# Patient Record
Sex: FEMALE | Race: WHITE | NOT HISPANIC OR LATINO | Employment: OTHER | URBAN - METROPOLITAN AREA
[De-identification: names, ages, dates, MRNs, and addresses within clinical notes are randomized per-mention and may not be internally consistent; named-entity substitution may affect disease eponyms.]

---

## 2016-06-23 LAB
HUMAN PAPILLOMAVIRUS (HPV): NORMAL
PAP SMEAR: NORMAL

## 2017-01-30 RX ORDER — FLUTICASONE PROPIONATE AND SALMETEROL 50; 250 UG/1; UG/1
POWDER RESPIRATORY (INHALATION)
Qty: 60 EACH | Refills: 11 | Status: SHIPPED | OUTPATIENT
Start: 2017-01-30 | End: 2018-05-22 | Stop reason: SDUPTHER

## 2017-03-17 RX ORDER — ONDANSETRON HYDROCHLORIDE 8 MG/1
TABLET, FILM COATED ORAL
Qty: 30 TABLET | Refills: 5 | Status: SHIPPED | OUTPATIENT
Start: 2017-03-17 | End: 2017-08-07 | Stop reason: SDUPTHER

## 2017-03-28 DIAGNOSIS — E03.4 HYPOTHYROIDISM DUE TO ACQUIRED ATROPHY OF THYROID: ICD-10-CM

## 2017-03-28 RX ORDER — LEVOTHYROXINE SODIUM 200 UG/1
TABLET ORAL
Qty: 30 TABLET | Refills: 11 | Status: SHIPPED | OUTPATIENT
Start: 2017-03-28 | End: 2017-08-07 | Stop reason: SDUPTHER

## 2017-08-03 ENCOUNTER — LAB VISIT (OUTPATIENT)
Dept: LAB | Facility: HOSPITAL | Age: 47
End: 2017-08-03
Attending: FAMILY MEDICINE
Payer: MEDICARE

## 2017-08-03 DIAGNOSIS — E03.4 HYPOTHYROIDISM DUE TO ACQUIRED ATROPHY OF THYROID: ICD-10-CM

## 2017-08-03 DIAGNOSIS — E78.5 HLD (HYPERLIPIDEMIA): ICD-10-CM

## 2017-08-03 LAB
ALBUMIN SERPL BCP-MCNC: 3.9 G/DL
ALP SERPL-CCNC: 48 U/L
ALT SERPL W/O P-5'-P-CCNC: 11 U/L
ANION GAP SERPL CALC-SCNC: 11 MMOL/L
AST SERPL-CCNC: 15 U/L
BILIRUB SERPL-MCNC: 0.4 MG/DL
BUN SERPL-MCNC: 10 MG/DL
CALCIUM SERPL-MCNC: 9.3 MG/DL
CHLORIDE SERPL-SCNC: 101 MMOL/L
CHOLEST/HDLC SERPL: 4.8 {RATIO}
CO2 SERPL-SCNC: 20 MMOL/L
CREAT SERPL-MCNC: 0.7 MG/DL
EST. GFR  (AFRICAN AMERICAN): >60 ML/MIN/1.73 M^2
EST. GFR  (NON AFRICAN AMERICAN): >60 ML/MIN/1.73 M^2
GLUCOSE SERPL-MCNC: 81 MG/DL
HDL/CHOLESTEROL RATIO: 20.6 %
HDLC SERPL-MCNC: 286 MG/DL
HDLC SERPL-MCNC: 59 MG/DL
LDLC SERPL CALC-MCNC: 178.2 MG/DL
NONHDLC SERPL-MCNC: 227 MG/DL
POTASSIUM SERPL-SCNC: 4 MMOL/L
PROT SERPL-MCNC: 7 G/DL
SODIUM SERPL-SCNC: 132 MMOL/L
TRIGL SERPL-MCNC: 244 MG/DL
TSH SERPL DL<=0.005 MIU/L-ACNC: 2.85 UIU/ML

## 2017-08-03 PROCEDURE — 80053 COMPREHEN METABOLIC PANEL: CPT

## 2017-08-03 PROCEDURE — 84443 ASSAY THYROID STIM HORMONE: CPT

## 2017-08-03 PROCEDURE — 80061 LIPID PANEL: CPT

## 2017-08-03 PROCEDURE — 36415 COLL VENOUS BLD VENIPUNCTURE: CPT | Mod: PO

## 2017-08-07 ENCOUNTER — OFFICE VISIT (OUTPATIENT)
Dept: FAMILY MEDICINE | Facility: CLINIC | Age: 47
End: 2017-08-07
Payer: MEDICARE

## 2017-08-07 ENCOUNTER — LAB VISIT (OUTPATIENT)
Dept: LAB | Facility: HOSPITAL | Age: 47
End: 2017-08-07
Attending: NURSE PRACTITIONER
Payer: MEDICARE

## 2017-08-07 VITALS
HEART RATE: 80 BPM | OXYGEN SATURATION: 97 % | SYSTOLIC BLOOD PRESSURE: 110 MMHG | TEMPERATURE: 98 F | RESPIRATION RATE: 18 BRPM | HEIGHT: 67 IN | WEIGHT: 169.75 LBS | BODY MASS INDEX: 26.64 KG/M2 | DIASTOLIC BLOOD PRESSURE: 72 MMHG

## 2017-08-07 DIAGNOSIS — F32.A DEPRESSION, UNSPECIFIED DEPRESSION TYPE: Primary | ICD-10-CM

## 2017-08-07 DIAGNOSIS — E03.4 HYPOTHYROIDISM DUE TO ACQUIRED ATROPHY OF THYROID: ICD-10-CM

## 2017-08-07 DIAGNOSIS — F31.9 BIPOLAR 1 DISORDER: ICD-10-CM

## 2017-08-07 DIAGNOSIS — H81.03 MENIERE'S DISEASE OF BOTH EARS: ICD-10-CM

## 2017-08-07 DIAGNOSIS — F17.200 SMOKER: ICD-10-CM

## 2017-08-07 DIAGNOSIS — E87.1 HYPONATREMIA: ICD-10-CM

## 2017-08-07 DIAGNOSIS — E78.5 HYPERLIPIDEMIA, UNSPECIFIED HYPERLIPIDEMIA TYPE: ICD-10-CM

## 2017-08-07 DIAGNOSIS — G25.81 RESTLESS LEG SYNDROME: ICD-10-CM

## 2017-08-07 DIAGNOSIS — R25.2 LEG CRAMPS: ICD-10-CM

## 2017-08-07 LAB
ANION GAP SERPL CALC-SCNC: 13 MMOL/L
BUN SERPL-MCNC: 11 MG/DL
CALCIUM SERPL-MCNC: 9.2 MG/DL
CHLORIDE SERPL-SCNC: 105 MMOL/L
CK SERPL-CCNC: 79 U/L
CO2 SERPL-SCNC: 20 MMOL/L
CREAT SERPL-MCNC: 0.8 MG/DL
EST. GFR  (AFRICAN AMERICAN): >60 ML/MIN/1.73 M^2
EST. GFR  (NON AFRICAN AMERICAN): >60 ML/MIN/1.73 M^2
GLUCOSE SERPL-MCNC: 114 MG/DL
POTASSIUM SERPL-SCNC: 3.9 MMOL/L
SODIUM SERPL-SCNC: 138 MMOL/L

## 2017-08-07 PROCEDURE — 99999 PR PBB SHADOW E&M-EST. PATIENT-LVL V: CPT | Mod: PBBFAC,,, | Performed by: NURSE PRACTITIONER

## 2017-08-07 PROCEDURE — 99214 OFFICE O/P EST MOD 30 MIN: CPT | Mod: S$GLB,,, | Performed by: NURSE PRACTITIONER

## 2017-08-07 PROCEDURE — 80048 BASIC METABOLIC PNL TOTAL CA: CPT

## 2017-08-07 PROCEDURE — 82550 ASSAY OF CK (CPK): CPT

## 2017-08-07 PROCEDURE — 3008F BODY MASS INDEX DOCD: CPT | Mod: S$GLB,,, | Performed by: NURSE PRACTITIONER

## 2017-08-07 PROCEDURE — 99499 UNLISTED E&M SERVICE: CPT | Mod: S$GLB,,, | Performed by: NURSE PRACTITIONER

## 2017-08-07 PROCEDURE — 36415 COLL VENOUS BLD VENIPUNCTURE: CPT | Mod: PO

## 2017-08-07 RX ORDER — MECLIZINE HYDROCHLORIDE 25 MG/1
25 TABLET ORAL 3 TIMES DAILY PRN
Qty: 90 TABLET | Refills: 5 | Status: SHIPPED | OUTPATIENT
Start: 2017-08-07 | End: 2018-01-04 | Stop reason: SDUPTHER

## 2017-08-07 RX ORDER — BUSPIRONE HYDROCHLORIDE 15 MG/1
TABLET ORAL ONCE
COMMUNITY
Start: 2017-05-16 | End: 2017-08-07 | Stop reason: SDUPTHER

## 2017-08-07 RX ORDER — VARENICLINE TARTRATE 0.5 (11)-1
KIT ORAL
Qty: 1 PACKAGE | Refills: 0 | Status: SHIPPED | OUTPATIENT
Start: 2017-08-07 | End: 2017-09-11 | Stop reason: SDUPTHER

## 2017-08-07 RX ORDER — LEVOTHYROXINE SODIUM 200 UG/1
200 TABLET ORAL DAILY
Qty: 90 TABLET | Refills: 1 | Status: SHIPPED | OUTPATIENT
Start: 2017-08-07 | End: 2018-05-16 | Stop reason: SDUPTHER

## 2017-08-07 RX ORDER — SILVER SULFADIAZINE 10 G/1000G
CREAM TOPICAL
COMMUNITY
Start: 2017-05-02 | End: 2017-08-07

## 2017-08-07 RX ORDER — BUSPIRONE HYDROCHLORIDE 7.5 MG/1
7.5 TABLET ORAL 3 TIMES DAILY
Qty: 270 TABLET | Refills: 0 | Status: SHIPPED | OUTPATIENT
Start: 2017-08-07 | End: 2018-02-26

## 2017-08-07 RX ORDER — ROPINIROLE 0.5 MG/1
TABLET, FILM COATED ORAL
Qty: 60 TABLET | Refills: 1 | Status: SHIPPED | OUTPATIENT
Start: 2017-08-07 | End: 2017-09-04 | Stop reason: SDUPTHER

## 2017-08-07 RX ORDER — PRAVASTATIN SODIUM 40 MG/1
TABLET ORAL
Qty: 90 TABLET | Refills: 3 | Status: SHIPPED | OUTPATIENT
Start: 2017-08-07 | End: 2018-05-21 | Stop reason: ALTCHOICE

## 2017-08-07 RX ORDER — PROMETHAZINE HYDROCHLORIDE 25 MG/1
TABLET ORAL
Refills: 0 | COMMUNITY
Start: 2017-05-30 | End: 2017-08-07

## 2017-08-07 RX ORDER — SERTRALINE HYDROCHLORIDE 100 MG/1
TABLET, FILM COATED ORAL DAILY
COMMUNITY
Start: 2017-06-29 | End: 2017-09-25 | Stop reason: SDUPTHER

## 2017-08-07 RX ORDER — CLONIDINE HYDROCHLORIDE 0.1 MG/1
TABLET ORAL ONCE
COMMUNITY
Start: 2017-07-14 | End: 2017-08-07 | Stop reason: ALTCHOICE

## 2017-08-07 NOTE — PROGRESS NOTES
Subjective:       Patient ID: Noreen Wheatley is a 46 y.o. female.    Chief Complaint: Leg Pain (both leg pain only at night. Pain fo a month. Legs jumped and hurt and can't sleep. Needs med refills Synthroid,cholesterol med,nicotine and antivert)    Last seen 8-2016 with Dr Haile   Off subozone 2 months and klonopin 7 months   Feeling better - since off     Eating worse  HLD- pravastatin 40   Back pain - off all meds   COPD - smoker - advair   Hypothyroid- stable on meds   menieres disease - stable on PRN antivert  Bipolar - still on risperdal, zoloft, trazodone through psych   Taking 15 mg buspar but just  once and day and still with anxiety      Leg Pain    The incident occurred more than 1 week ago (1 mo). The incident occurred at home. There was no injury mechanism. The pain is present in the left leg and right leg. The pain is at a severity of 5/10. The pain is moderate. The pain has been fluctuating since onset. Pertinent negatives include no inability to bear weight, loss of motion, loss of sensation, muscle weakness, numbness or tingling. Associated symptoms comments: Muscle pain . Nothing aggravates the symptoms. She has tried rest for the symptoms. The treatment provided no relief.     Vitals:    08/07/17 1254   BP: 110/72   Pulse: 80   Resp: 18   Temp: 98.3 °F (36.8 °C)     Review of Systems   Constitutional: Negative.  Negative for diaphoresis, fatigue and fever.   HENT: Negative.    Eyes: Negative.    Respiratory: Negative.  Negative for cough, shortness of breath and wheezing.    Cardiovascular: Negative.  Negative for chest pain and leg swelling.   Gastrointestinal: Negative.  Negative for abdominal pain, diarrhea and nausea.   Endocrine: Negative.    Genitourinary: Negative.  Negative for dysuria and hematuria.   Musculoskeletal: Positive for arthralgias and myalgias.        Leg cramping and involuntary movements    Skin: Negative.  Negative for color change and rash.   Allergic/Immunologic:  Negative.    Neurological: Negative.  Negative for tingling, speech difficulty and numbness.   Hematological: Negative.    Psychiatric/Behavioral: Negative.        Past Medical History:   Diagnosis Date    Abnormal Pap smear     19 years old//per patient normal since    Anemia     Anxiety     Asthma, persistent controlled     Bipolar 1 disorder     Cervical dysplasia     Chronic constipation     COPD (chronic obstructive pulmonary disease)     Depression     GERD (gastroesophageal reflux disease)     HLD (hyperlipidemia)     Hypothyroidism     Panic attack      Objective:      Physical Exam   Constitutional: She is oriented to person, place, and time. She appears well-developed and well-nourished.   HENT:   Head: Normocephalic and atraumatic.   Mouth/Throat: Oropharynx is clear and moist.   Eyes: Conjunctivae and EOM are normal. Pupils are equal, round, and reactive to light.   Neck: Neck supple.   Cardiovascular: Normal rate, regular rhythm, normal heart sounds and intact distal pulses.  Exam reveals no friction rub.    No murmur heard.  Pulmonary/Chest: Effort normal and breath sounds normal.   Abdominal: Soft. Bowel sounds are normal.   Musculoskeletal:        Right lower leg: She exhibits no tenderness.        Left lower leg: She exhibits no tenderness.        Legs:  Neurological: She is alert and oriented to person, place, and time.   Skin: Skin is warm and dry.   Psychiatric: She has a normal mood and affect. Her behavior is normal.   Nursing note and vitals reviewed.      Assessment:       1. Depression, unspecified depression type    2. Meniere's disease of both ears    3. Hypothyroidism due to acquired atrophy of thyroid    4. Hyperlipidemia, unspecified hyperlipidemia type    5. Smoker    6. Restless leg syndrome    7. Hyponatremia    8. Leg cramps    9. Bipolar 1 disorder        Plan:       Depression, unspecified depression type  -     busPIRone (BUSPAR) 7.5 MG tablet; Take 1 tablet (7.5 mg  total) by mouth 3 (three) times daily.  Dispense: 270 tablet; Refill: 0    Meniere's disease of both ears  -     meclizine (ANTIVERT) 25 mg tablet; Take 1 tablet (25 mg total) by mouth 3 (three) times daily as needed.  Dispense: 90 tablet; Refill: 5    Hypothyroidism due to acquired atrophy of thyroid  -     levothyroxine (SYNTHROID) 200 MCG tablet; Take 1 tablet (200 mcg total) by mouth once daily.  Dispense: 90 tablet; Refill: 1    Hyperlipidemia, unspecified hyperlipidemia type  -     pravastatin (PRAVACHOL) 40 MG tablet; Take 1 tablet (40 mg total) by mouth once daily.  Dispense: 90 tablet; Refill: 3    Smoker  -     varenicline (CHANTIX JOHN) 0.5 mg (11)- 1 mg (42) tablet; Take as directed on pack  Dispense: 1 Package; Refill: 0  -     Ambulatory referral to Smoking Cessation Program    Restless leg syndrome  -     ropinirole (REQUIP) 0.5 MG tablet; Take 1 tablet at night for 2 night then can increase 2 tablets QHS  Dispense: 60 tablet; Refill: 1    Hyponatremia  -     Basic metabolic panel; Future; Expected date: 08/07/2017    Leg cramps  -     CK; Future; Expected date: 08/07/2017    Bipolar 1 disorder  Stable on meds from her psych       FU in 2-3 months

## 2017-08-11 ENCOUNTER — TELEPHONE (OUTPATIENT)
Dept: SMOKING CESSATION | Facility: CLINIC | Age: 47
End: 2017-08-11

## 2017-08-25 ENCOUNTER — TELEPHONE (OUTPATIENT)
Dept: FAMILY MEDICINE | Facility: CLINIC | Age: 47
End: 2017-08-25

## 2017-08-25 NOTE — TELEPHONE ENCOUNTER
Notified patient that prescriptions were sent on 8/7/17. Advised to call pharmacy and make sure they had received them and let us know if they had not and there was an error.

## 2017-08-25 NOTE — TELEPHONE ENCOUNTER
----- Message from Maria R Diamond sent at 8/24/2017  3:05 PM CDT -----  Contact: Fanny young/ [employer]  Fanny young/ [employer] calling in regards to the patient requesting a refill for Levothyroxine 200 mg and Pravastatin 40 mg, 90 day supply for both. Please advise.  Call back   Thanks!  [facility] [place] - [address] [address]  [address] [address]  [facility] [address]  Phone: [phone] Fax: [phone]

## 2017-08-25 NOTE — TELEPHONE ENCOUNTER
----- Message from Ami Mandujano sent at 8/24/2017  1:24 PM CDT -----  Contact: Noreen Wheatley  Good afternoon,    Ms. Wheatley came in and is requesting a refill on her Thyroid and cholesterol medication. May you please contact  [facility] when you get the moment.

## 2017-09-04 DIAGNOSIS — G25.81 RESTLESS LEG SYNDROME: ICD-10-CM

## 2017-09-05 RX ORDER — ROPINIROLE 0.5 MG/1
1 TABLET, FILM COATED ORAL NIGHTLY
Qty: 60 TABLET | Refills: 1 | Status: SHIPPED | OUTPATIENT
Start: 2017-09-05 | End: 2017-10-06 | Stop reason: SDUPTHER

## 2017-09-11 DIAGNOSIS — F17.200 SMOKER: ICD-10-CM

## 2017-09-11 RX ORDER — VARENICLINE TARTRATE 0.5 (11)-1
KIT ORAL
Qty: 1 PACKAGE | Refills: 0 | Status: SHIPPED | OUTPATIENT
Start: 2017-09-11 | End: 2017-09-19 | Stop reason: DRUGHIGH

## 2017-09-18 DIAGNOSIS — F17.200 SMOKER: ICD-10-CM

## 2017-09-19 RX ORDER — VARENICLINE TARTRATE 0.5 (11)-1
KIT ORAL
Qty: 53 TABLET | OUTPATIENT
Start: 2017-09-19

## 2017-09-19 RX ORDER — VARENICLINE TARTRATE 1 MG/1
1 TABLET, FILM COATED ORAL 2 TIMES DAILY
Qty: 60 TABLET | Refills: 5 | Status: SHIPPED | OUTPATIENT
Start: 2017-09-19 | End: 2017-10-19

## 2017-09-25 RX ORDER — SERTRALINE HYDROCHLORIDE 100 MG/1
100 TABLET, FILM COATED ORAL DAILY
Qty: 30 TABLET | Refills: 5 | Status: SHIPPED | OUTPATIENT
Start: 2017-09-25 | End: 2018-05-21 | Stop reason: ALTCHOICE

## 2017-10-06 DIAGNOSIS — G25.81 RESTLESS LEG SYNDROME: ICD-10-CM

## 2017-10-06 RX ORDER — ROPINIROLE 0.5 MG/1
1 TABLET, FILM COATED ORAL NIGHTLY
Qty: 60 TABLET | Refills: 1 | Status: SHIPPED | OUTPATIENT
Start: 2017-10-06 | End: 2018-01-03 | Stop reason: SDUPTHER

## 2017-10-30 RX ORDER — ONDANSETRON HYDROCHLORIDE 8 MG/1
TABLET, FILM COATED ORAL
Qty: 30 TABLET | Refills: 2 | Status: SHIPPED | OUTPATIENT
Start: 2017-10-30 | End: 2018-01-03 | Stop reason: SDUPTHER

## 2018-01-03 ENCOUNTER — TELEPHONE (OUTPATIENT)
Dept: FAMILY MEDICINE | Facility: CLINIC | Age: 48
End: 2018-01-03

## 2018-01-03 DIAGNOSIS — G25.81 RESTLESS LEG SYNDROME: ICD-10-CM

## 2018-01-03 RX ORDER — ONDANSETRON HYDROCHLORIDE 8 MG/1
TABLET, FILM COATED ORAL
Qty: 30 TABLET | Refills: 5 | Status: SHIPPED | OUTPATIENT
Start: 2018-01-03 | End: 2018-03-05 | Stop reason: SDUPTHER

## 2018-01-03 RX ORDER — ROPINIROLE 0.5 MG/1
1 TABLET, FILM COATED ORAL NIGHTLY
Qty: 60 TABLET | Refills: 0 | Status: SHIPPED | OUTPATIENT
Start: 2018-01-03 | End: 2018-04-30 | Stop reason: SDUPTHER

## 2018-01-03 NOTE — TELEPHONE ENCOUNTER
Notified patient that meds were filled and scheduled her to see Dr Haile for follow up. Patient verbalized understanding.

## 2018-01-04 DIAGNOSIS — H81.03 MENIERE'S DISEASE OF BOTH EARS: ICD-10-CM

## 2018-01-05 RX ORDER — MECLIZINE HYDROCHLORIDE 25 MG/1
25 TABLET ORAL 3 TIMES DAILY PRN
Qty: 90 TABLET | Refills: 0 | Status: ON HOLD | OUTPATIENT
Start: 2018-01-05 | End: 2020-07-06 | Stop reason: HOSPADM

## 2018-02-26 ENCOUNTER — OFFICE VISIT (OUTPATIENT)
Dept: FAMILY MEDICINE | Facility: CLINIC | Age: 48
End: 2018-02-26
Payer: MEDICARE

## 2018-02-26 ENCOUNTER — HOSPITAL ENCOUNTER (OUTPATIENT)
Dept: RADIOLOGY | Facility: HOSPITAL | Age: 48
Discharge: HOME | End: 2018-02-26
Attending: NURSE PRACTITIONER
Payer: MEDICARE

## 2018-02-26 VITALS
HEIGHT: 67 IN | WEIGHT: 170.63 LBS | SYSTOLIC BLOOD PRESSURE: 120 MMHG | HEART RATE: 101 BPM | DIASTOLIC BLOOD PRESSURE: 89 MMHG | RESPIRATION RATE: 16 BRPM | BODY MASS INDEX: 26.78 KG/M2 | OXYGEN SATURATION: 97 %

## 2018-02-26 DIAGNOSIS — J44.9 CHRONIC OBSTRUCTIVE PULMONARY DISEASE, UNSPECIFIED COPD TYPE: ICD-10-CM

## 2018-02-26 DIAGNOSIS — Z20.2 EXPOSURE TO STD: Primary | ICD-10-CM

## 2018-02-26 DIAGNOSIS — G25.81 RESTLESS LEG SYNDROME: ICD-10-CM

## 2018-02-26 DIAGNOSIS — G89.29 CHRONIC BILATERAL LOW BACK PAIN WITHOUT SCIATICA: ICD-10-CM

## 2018-02-26 DIAGNOSIS — M54.50 CHRONIC BILATERAL LOW BACK PAIN WITHOUT SCIATICA: ICD-10-CM

## 2018-02-26 DIAGNOSIS — E03.4 HYPOTHYROIDISM DUE TO ACQUIRED ATROPHY OF THYROID: ICD-10-CM

## 2018-02-26 DIAGNOSIS — R05.9 COUGH: ICD-10-CM

## 2018-02-26 DIAGNOSIS — F17.200 SMOKING: ICD-10-CM

## 2018-02-26 DIAGNOSIS — R06.2 WHEEZING: ICD-10-CM

## 2018-02-26 DIAGNOSIS — E78.5 HYPERLIPIDEMIA, UNSPECIFIED HYPERLIPIDEMIA TYPE: ICD-10-CM

## 2018-02-26 PROCEDURE — 72110 X-RAY EXAM L-2 SPINE 4/>VWS: CPT | Mod: TC,FY,PO

## 2018-02-26 PROCEDURE — 99999 PR PBB SHADOW E&M-EST. PATIENT-LVL V: CPT | Mod: PBBFAC,,, | Performed by: NURSE PRACTITIONER

## 2018-02-26 PROCEDURE — 99214 OFFICE O/P EST MOD 30 MIN: CPT | Mod: S$GLB,,, | Performed by: NURSE PRACTITIONER

## 2018-02-26 PROCEDURE — 3008F BODY MASS INDEX DOCD: CPT | Mod: S$GLB,,, | Performed by: NURSE PRACTITIONER

## 2018-02-26 PROCEDURE — 99499 UNLISTED E&M SERVICE: CPT | Mod: S$GLB,,, | Performed by: NURSE PRACTITIONER

## 2018-02-26 PROCEDURE — 94640 AIRWAY INHALATION TREATMENT: CPT | Mod: S$GLB,,, | Performed by: FAMILY MEDICINE

## 2018-02-26 PROCEDURE — 72110 X-RAY EXAM L-2 SPINE 4/>VWS: CPT | Mod: 26,,, | Performed by: RADIOLOGY

## 2018-02-26 RX ORDER — ALBUTEROL SULFATE 90 UG/1
AEROSOL, METERED RESPIRATORY (INHALATION)
Qty: 18 G | Refills: 11 | Status: SHIPPED | OUTPATIENT
Start: 2018-02-26 | End: 2018-05-21

## 2018-02-26 RX ORDER — ETODOLAC 500 MG/1
500 TABLET, FILM COATED ORAL 2 TIMES DAILY
Qty: 40 TABLET | Refills: 0 | Status: SHIPPED | OUTPATIENT
Start: 2018-02-26 | End: 2018-10-10

## 2018-02-26 RX ORDER — ALBUTEROL SULFATE 0.83 MG/ML
2.5 SOLUTION RESPIRATORY (INHALATION)
Status: COMPLETED | OUTPATIENT
Start: 2018-02-26 | End: 2018-02-26

## 2018-02-26 RX ORDER — ALBUTEROL SULFATE 0.83 MG/ML
2.5 SOLUTION RESPIRATORY (INHALATION) EVERY 6 HOURS PRN
Qty: 75 ML | Refills: 0 | Status: SHIPPED | OUTPATIENT
Start: 2018-02-26 | End: 2018-05-21

## 2018-02-26 RX ADMIN — ALBUTEROL SULFATE 2.5 MG: 0.83 SOLUTION RESPIRATORY (INHALATION) at 12:02

## 2018-02-26 NOTE — PROGRESS NOTES
Subjective:       Patient ID: Noreen Wheatley is a 47 y.o. female.    Chief Complaint: Leg Pain (severe leg pain in both legs; started a year ago); Back Pain (low back pain ); and Exposure to STD (requesting STD )    Here today with back pain- continual  - takes her a minute to get up and walk when she stands - both legs bilaterally- they are cramping and spasms   She took neurontin 600 TID from a friend and elavil 100 from friend     HLD- pravastatin 40   COPD - smoker - advair - wheezing more - smoking more-= needs to quit and needs neb   Hypothyroid- stable on meds   menieres disease - stable on PRN antivert  Bipolar - still on zoloft and buspar - but still with anxiety     Taking 15 mg buspar but just  once and day and still with anxiety  She also states that her ex fiance was cheating on her and she want to be tested for any STD       Leg Pain    The incident occurred more than 1 week ago. The incident occurred at home. There was no injury mechanism. The pain is present in the right thigh and left thigh. The quality of the pain is described as aching. The pain is at a severity of 5/10. The pain is moderate. The pain has been improving since onset. Associated symptoms include muscle weakness. Pertinent negatives include no inability to bear weight, loss of motion, loss of sensation, numbness or tingling. Associated symptoms comments: Burning   Bilateral weakness . She has tried rest (neurontin) for the symptoms.   Back Pain   This is a chronic problem. The current episode started more than 1 year ago. The pain is present in the lumbar spine. The quality of the pain is described as aching. The pain is at a severity of 8/10. The patient is experiencing no pain. The symptoms are aggravated by standing. Stiffness is present all day. Associated symptoms include leg pain and weakness. Pertinent negatives include no abdominal pain, bladder incontinence, bowel incontinence, chest pain, dysuria, fever, headaches, numbness,  paresis, paresthesias, tingling or weight loss.   Cough   This is a recurrent problem. The current episode started 1 to 4 weeks ago. The problem has been unchanged. The problem occurs every few hours. The cough is non-productive. Associated symptoms include myalgias, shortness of breath and wheezing. Pertinent negatives include no chest pain, chills, ear congestion, ear pain, fever, headaches, heartburn, hemoptysis, nasal congestion, postnasal drip, rash, rhinorrhea, sore throat, sweats or weight loss. Risk factors for lung disease include smoking/tobacco exposure. She has tried a beta-agonist inhaler, steroid inhaler and rest for the symptoms. The treatment provided mild relief. Her past medical history is significant for COPD.     Vitals:    02/26/18 1225   BP: 120/89   Pulse: 101   Resp: 16     Review of Systems   Constitutional: Positive for activity change. Negative for appetite change, chills, fever and weight loss.   HENT: Negative.  Negative for ear pain, postnasal drip, rhinorrhea and sore throat.    Respiratory: Positive for cough, shortness of breath and wheezing. Negative for hemoptysis.    Cardiovascular: Negative.  Negative for chest pain.   Gastrointestinal: Negative.  Negative for abdominal pain, bowel incontinence and heartburn.   Genitourinary: Negative.  Negative for bladder incontinence and dysuria.   Musculoskeletal: Positive for arthralgias, back pain and myalgias.   Skin: Negative.  Negative for rash.   Neurological: Positive for weakness. Negative for tingling, numbness, headaches and paresthesias.   Hematological: Negative.    Psychiatric/Behavioral: Negative.        Past Medical History:   Diagnosis Date    Abnormal Pap smear     19 years old//per patient normal since    Anemia     Anxiety     Asthma, persistent controlled     Bipolar 1 disorder     Cervical dysplasia     Chronic constipation     COPD (chronic obstructive pulmonary disease)     Depression     GERD  (gastroesophageal reflux disease)     HLD (hyperlipidemia)     Hypothyroidism     Panic attack      Objective:      Physical Exam   Constitutional: She is oriented to person, place, and time. She appears well-developed and well-nourished.   HENT:   Head: Normocephalic and atraumatic.   Mouth/Throat: Oropharynx is clear and moist.   Eyes: Conjunctivae and EOM are normal. Pupils are equal, round, and reactive to light.   Neck: Neck supple.   Cardiovascular: Normal rate, regular rhythm, normal heart sounds and intact distal pulses.  Exam reveals no friction rub.    No murmur heard.  Pulmonary/Chest: Effort normal. Tachypnea noted. No respiratory distress. She has wheezes. She has rhonchi.   Abdominal: Soft. Bowel sounds are normal.   Musculoskeletal:        Lumbar back: She exhibits decreased range of motion, tenderness, pain and spasm.   Neurological: She is alert and oriented to person, place, and time.   Skin: Skin is warm and dry.   Psychiatric: She has a normal mood and affect. Her speech is normal and behavior is normal. Judgment normal. Cognition and memory are normal.   Nursing note and vitals reviewed.      Assessment:       1. Exposure to STD    2. Chronic bilateral low back pain without sciatica    3. Restless leg syndrome    4. Hypothyroidism due to acquired atrophy of thyroid    5. Hyperlipidemia, unspecified hyperlipidemia type    6. Wheezing    7. Cough    8. Chronic obstructive pulmonary disease, unspecified COPD type    9. Smoking        Plan:       Exposure to STD  -     RPR; Future; Expected date: 02/26/2018  -     HIV-1 and HIV-2 antibodies; Future; Expected date: 02/26/2018  -     Hepatitis panel, acute; Future; Expected date: 02/26/2018  -     C. trachomatis/N. gonorrhoeae by AMP DNA Urine  -     HERPES SIMPLEX VIRUS (HSV) TYPE 1 & 2 DNA BY PCR; Future; Expected date: 02/26/2018    Chronic bilateral low back pain without sciatica  -     X-Ray Lumbar Spine Complete 5 View; Future; Expected date:  02/26/2018  -     etodolac (LODINE) 500 MG tablet; Take 1 tablet (500 mg total) by mouth 2 (two) times daily.  Dispense: 40 tablet; Refill: 0    Restless leg syndrome  -     X-Ray Lumbar Spine Complete 5 View; Future; Expected date: 02/26/2018    Hypothyroidism due to acquired atrophy of thyroid  -     TSH; Future; Expected date: 02/26/2018  -     T4, FREE; Future; Expected date: 02/26/2018    Hyperlipidemia, unspecified hyperlipidemia type  On meds     Wheezing  -     albuterol nebulizer solution 2.5 mg; Take 3 mLs (2.5 mg total) by nebulization one time.  -     NEBULIZER FOR HOME USE  -     albuterol (VENTOLIN HFA) 90 mcg/actuation inhaler; INHALE TWO PUFFS INTO THE LUNGS EVERY SIX HOURS  Dispense: 18 g; Refill: 11  Albuterol for  nebulizer     Cough  -     albuterol nebulizer solution 2.5 mg; Take 3 mLs (2.5 mg total) by nebulization one time.  -     NEBULIZER FOR HOME USE  -     albuterol (VENTOLIN HFA) 90 mcg/actuation inhaler; INHALE TWO PUFFS INTO THE LUNGS EVERY SIX HOURS  Dispense: 18 g; Refill: 11    Chronic obstructive pulmonary disease, unspecified COPD type  -     NEBULIZER FOR HOME USE  -     albuterol (VENTOLIN HFA) 90 mcg/actuation inhaler; INHALE TWO PUFFS INTO THE LUNGS EVERY SIX HOURS  Dispense: 18 g; Refill: 11    Smoking  -     Ambulatory referral to Smoking Cessation Program        will call with xray and labs

## 2018-03-01 ENCOUNTER — TELEPHONE (OUTPATIENT)
Dept: FAMILY MEDICINE | Facility: CLINIC | Age: 48
End: 2018-03-01

## 2018-03-01 DIAGNOSIS — M79.605 BILATERAL LEG PAIN: Primary | ICD-10-CM

## 2018-03-01 DIAGNOSIS — E03.4 HYPOTHYROIDISM DUE TO ACQUIRED ATROPHY OF THYROID: Primary | ICD-10-CM

## 2018-03-01 DIAGNOSIS — M79.604 BILATERAL LEG PAIN: Primary | ICD-10-CM

## 2018-03-01 NOTE — TELEPHONE ENCOUNTER
----- Message from Myrna Bruce sent at 3/1/2018  2:01 PM CST -----  Please call [phone] returning call ...

## 2018-03-01 NOTE — TELEPHONE ENCOUNTER
----- Message from Myrna Bruce sent at 3/1/2018 10:50 AM CST -----  Call pt at [phone]  Asking for results of testing done 02/26

## 2018-03-01 NOTE — TELEPHONE ENCOUNTER
----- Message from Airam Meier sent at 3/1/2018 12:57 PM CST -----  Contact: self  Returning missed call regarding test results. Please call back [phone] (home)

## 2018-03-01 NOTE — TELEPHONE ENCOUNTER
----- Message from RT Kristina sent at 3/1/2018 10:55 AM CST -----  Contact: pt    pt , requesting lab test results as soon as possible, thanks.

## 2018-03-01 NOTE — TELEPHONE ENCOUNTER
----- Message from Bubba Mccarthy sent at 3/1/2018 11:52 AM CST -----  Contact: self   Placed call to pod, patient missed a call from your office. Please call back at [phone] (fxdd)

## 2018-03-01 NOTE — TELEPHONE ENCOUNTER
----- Message from August Vasquez sent at 3/1/2018  2:33 PM CST -----  Contact: same  Patient called in and stated she has been playing phone tag with office and really needs her lab/test results that were done on 2/26/18 at [facility].  Patient call back number is [phone] or [phone]

## 2018-03-05 ENCOUNTER — OFFICE VISIT (OUTPATIENT)
Dept: SPINE | Facility: CLINIC | Age: 48
End: 2018-03-05
Payer: MEDICARE

## 2018-03-05 VITALS
SYSTOLIC BLOOD PRESSURE: 121 MMHG | HEIGHT: 67 IN | BODY MASS INDEX: 26.96 KG/M2 | HEART RATE: 93 BPM | WEIGHT: 171.75 LBS | DIASTOLIC BLOOD PRESSURE: 79 MMHG

## 2018-03-05 DIAGNOSIS — M54.5 CHRONIC BILATERAL LOW BACK PAIN, WITH SCIATICA PRESENCE UNSPECIFIED: ICD-10-CM

## 2018-03-05 DIAGNOSIS — G89.29 CHRONIC BILATERAL LOW BACK PAIN, WITH SCIATICA PRESENCE UNSPECIFIED: ICD-10-CM

## 2018-03-05 DIAGNOSIS — M79.651 PAIN IN BOTH THIGHS: Primary | ICD-10-CM

## 2018-03-05 DIAGNOSIS — M79.652 PAIN IN BOTH THIGHS: Primary | ICD-10-CM

## 2018-03-05 PROCEDURE — 99203 OFFICE O/P NEW LOW 30 MIN: CPT | Mod: S$GLB,,, | Performed by: PHYSICIAN ASSISTANT

## 2018-03-05 PROCEDURE — 99999 PR PBB SHADOW E&M-EST. PATIENT-LVL IV: CPT | Mod: PBBFAC,,, | Performed by: PHYSICIAN ASSISTANT

## 2018-03-05 RX ORDER — DIAZEPAM 10 MG/1
10 TABLET ORAL ONCE
Qty: 1 TABLET | Refills: 0 | Status: SHIPPED | OUTPATIENT
Start: 2018-03-05 | End: 2018-10-10

## 2018-03-05 RX ORDER — GABAPENTIN 300 MG/1
300 CAPSULE ORAL 3 TIMES DAILY
Qty: 90 CAPSULE | Refills: 2 | Status: SHIPPED | OUTPATIENT
Start: 2018-03-05 | End: 2018-05-21 | Stop reason: ALTCHOICE

## 2018-03-05 NOTE — LETTER
March 7, 2018      Lita Griffith, NP  [address] [facility] [address] [place] [address]           [facility]  [address] [facility] [address]  [facility] [address]  Phone: [phone]  Fax: [phone]          Patient: Noreen Wheatley   MR Number: [id number]   YOB: 1970   Date of Visit: 3/5/2018       Dear Lita Griffith:    Thank you for referring Noreen Wheatley to me for evaluation. Attached you will find relevant portions of my assessment and plan of care.    If you have questions, please do not hesitate to call me. I look forward to following Noreen Wheatley along with you.    Sincerely,    Christal Perez PA-C    Enclosure  CC:  No Recipients    If you would like to receive this communication electronically, please contact [email or web address] or [phone] to request more information on [employer] Link access.    For providers and/or their staff who would like to refer a patient to [facility], please contact us through our one-stop-shop provider referral line, [facility], at [phone].    If you feel you have received this communication in error or would no longer like to receive these types of communications, please e-mail [email or web address]

## 2018-03-07 NOTE — PROGRESS NOTES
Neurosurgery History & Physical    Patient ID: Noreen Wheatley is a 47 y.o. female.    Chief Complaint   Patient presents with    Low-back Pain     Pain started 1 year ago. Nurontin makes pain better. standing from a sitting position makes pain worse.    Leg Pain     Pain radiates down both legs.       Review of Systems   Constitutional: Negative for activity change, appetite change, chills, fever and unexpected weight change.   HENT: Negative for tinnitus, trouble swallowing and voice change.    Respiratory: Negative for apnea, cough, chest tightness and shortness of breath.    Cardiovascular: Negative for chest pain and palpitations.   Gastrointestinal: Negative for constipation, diarrhea, nausea and vomiting.   Genitourinary: Negative for difficulty urinating, dysuria, frequency and urgency.   Musculoskeletal: Positive for back pain and gait problem. Negative for neck pain and neck stiffness.   Skin: Negative for wound.   Neurological: Positive for weakness. Negative for dizziness, tremors, seizures, facial asymmetry, speech difficulty, light-headedness, numbness and headaches.   Psychiatric/Behavioral: Negative for confusion and decreased concentration.       Past Medical History:   Diagnosis Date    Abnormal Pap smear     19 years old//per patient normal since    Anemia     Anxiety     Asthma, persistent controlled     Bipolar 1 disorder     Cervical dysplasia     Chronic constipation     COPD (chronic obstructive pulmonary disease)     Depression     GERD (gastroesophageal reflux disease)     HLD (hyperlipidemia)     Hypothyroidism     Panic attack      Social History     Social History    Marital status: Legally      Spouse name: N/A    Number of children: 2    Years of education: N/A     Occupational History    disability [employer]     Social History Main Topics    Smoking status: Current Some Day Smoker     Packs/day: 1.50     Years: 15.00     Types: Cigarettes    Smokeless  tobacco: Never Used    Alcohol use No    Drug use: No    Sexual activity: No     Other Topics Concern    Not on file     Social History Narrative    So with Cuzan disease     Family History   Problem Relation Age of Onset    Thyroid disease Mother     Breast cancer Mother 50    Diabetes Father     Hyperlipidemia Father     Diabetes Sister     Cancer Sister      liver    Liver cancer Sister     Thyroid disease Sister      Review of patient's allergies indicates:  No Known Allergies    Current Outpatient Prescriptions:     ADVAIR DISKUS 250-50 mcg/dose diskus inhaler, TAKE ONE PUFF INTO THE LUNGS TWICE DAILY, Disp: 60 each, Rfl: 11    albuterol (PROVENTIL) 2.5 mg /3 mL (0.083 %) nebulizer solution, Take 3 mLs (2.5 mg total) by nebulization every 6 (six) hours as needed for Wheezing. Rescue, Disp: 75 mL, Rfl: 0    albuterol (VENTOLIN HFA) 90 mcg/actuation inhaler, INHALE TWO PUFFS INTO THE LUNGS EVERY SIX HOURS, Disp: 18 g, Rfl: 11    etodolac (LODINE) 500 MG tablet, Take 1 tablet (500 mg total) by mouth 2 (two) times daily., Disp: 40 tablet, Rfl: 0    levothyroxine (SYNTHROID) 200 MCG tablet, Take 1 tablet (200 mcg total) by mouth once daily., Disp: 90 tablet, Rfl: 1    meclizine (ANTIVERT) 25 mg tablet, Take 1 tablet (25 mg total) by mouth 3 (three) times daily as needed., Disp: 90 tablet, Rfl: 0    ondansetron (ZOFRAN) 8 MG tablet, TAKE ONE TABLET BY MOUTH EVERY EIGHT HOURS AS NEEDED FOR FOR NAUSEA, Disp: 30 tablet, Rfl: 0    pravastatin (PRAVACHOL) 40 MG tablet, Take 1 tablet (40 mg total) by mouth once daily., Disp: 90 tablet, Rfl: 3    sertraline (ZOLOFT) 100 MG tablet, Take 1 tablet (100 mg total) by mouth once daily., Disp: 30 tablet, Rfl: 5    busPIRone (BUSPAR) 7.5 MG tablet, Take 1 tablet (7.5 mg total) by mouth 3 (three) times daily., Disp: 270 tablet, Rfl: 0    diazePAM (VALIUM) 10 MG Tab, Take 1 tablet (10 mg total) by mouth once. 30 minutes prior to MRI.  Must have someone drive  "you to and from the faciliyt., Disp: 1 tablet, Rfl: 0    gabapentin (NEURONTIN) 300 MG capsule, Take 1 capsule (300 mg total) by mouth 3 (three) times daily., Disp: 90 capsule, Rfl: 2    rOPINIRole (REQUIP) 0.5 MG tablet, Take 2 tablets (1 mg total) by mouth every evening., Disp: 60 tablet, Rfl: 0    Vitals:    03/05/18 1343   BP: 121/79   BP Location: Right arm   Patient Position: Sitting   BP Method: Medium (Automatic)   Pulse: 93   Weight: 77.9 kg (171 lb 11.8 oz)   Height: 5' 7" (1.702 m)       Physical Exam   Constitutional: She is oriented to person, place, and time. She appears well-developed and well-nourished.   HENT:   Head: Normocephalic and atraumatic.   Eyes: Pupils are equal, round, and reactive to light.   Neck: Normal range of motion. Neck supple.   Cardiovascular: Normal rate.    Pulmonary/Chest: Effort normal.   Musculoskeletal: Normal range of motion. She exhibits no edema.   Neurological: She is alert and oriented to person, place, and time. She has a normal Finger-Nose-Finger Test, a normal Heel to Shin Test, a normal Romberg Test and a normal Tandem Gait Test. Gait normal.   Reflex Scores:       Tricep reflexes are 2+ on the right side and 2+ on the left side.       Bicep reflexes are 2+ on the right side and 2+ on the left side.       Brachioradialis reflexes are 2+ on the right side and 2+ on the left side.       Patellar reflexes are 2+ on the right side and 2+ on the left side.       Achilles reflexes are 2+ on the right side and 2+ on the left side.  Skin: Skin is warm, dry and intact.   Psychiatric: She has a normal mood and affect. Her speech is normal and behavior is normal. Judgment and thought content normal.   Nursing note and vitals reviewed.      Neurologic Exam     Mental Status   Oriented to person, place, and time.   Oriented to person.   Oriented to place.   Oriented to time.   Follows 3 step commands.   Attention: normal. Concentration: normal.   Speech: speech is normal "   Level of consciousness: alert  Knowledge: consistent with education.   Able to name object. Able to read. Able to repeat. Able to write. Normal comprehension.     Cranial Nerves     CN II   Visual acuity: normal  Right visual field deficit: none  Left visual field deficit: none     CN III, IV, VI   Pupils are equal, round, and reactive to light.  Right pupil: Size: 3 mm. Shape: regular. Reactivity: brisk. Consensual response: intact.   Left pupil: Size: 3 mm. Shape: regular. Reactivity: brisk. Consensual response: intact.   CN III: no CN III palsy  CN VI: no CN VI palsy  Nystagmus: none   Diplopia: none  Ophthalmoparesis: none  Conjugate gaze: present    CN V   Right facial sensation deficit: none  Left facial sensation deficit: none    CN VII   Right facial weakness: none  Left facial weakness: none    CN VIII   Hearing: intact    CN IX, X   CN IX normal.   CN X normal.     CN XI   Right sternocleidomastoid strength: normal  Left sternocleidomastoid strength: normal  Right trapezius strength: normal  Left trapezius strength: normal    CN XII   Fasciculations: absent  Tongue deviation: none    Motor Exam   Muscle bulk: normal  Overall muscle tone: normal  Right arm pronator drift: absent  Left arm pronator drift: absent    Strength   Right neck flexion: 5/5  Left neck flexion: 5/5  Right neck extension: 5/5  Left neck extension: 5/5  Right deltoid: 5/5  Left deltoid: 5/5  Right biceps: 5/5  Left biceps: 5/5  Right triceps: 5/5  Left triceps: 5/5  Right wrist flexion: 5/5  Left wrist flexion: 5/5  Right wrist extension: 5/5  Left wrist extension: 5/5  Right interossei: 5/5  Left interossei: 5/5  Right abdominals: 5/5  Left abdominals: 5/5  Right iliopsoas: 5/5  Left iliopsoas: 5/5  Right quadriceps: 5/5  Left quadriceps: 5/5  Right hamstrin/5  Left hamstrin/5  Right glutei: 5/5  Left glutei: 5/5  Right anterior tibial: 5/5  Left anterior tibial: 5/5  Right posterior tibial: 5/5  Left posterior tibial:  5/5  Right peroneal: 5/5  Left peroneal: 5/5  Right gastroc: 5/5  Left gastroc: 5/5    Sensory Exam   Right arm light touch: normal  Left arm light touch: normal  Right leg light touch: normal  Left leg light touch: normal  Right arm vibration: normal  Left arm vibration: normal  Right arm pinprick: normal  Left arm pinprick: normal    Gait, Coordination, and Reflexes     Gait  Gait: normal    Coordination   Romberg: negative  Finger to nose coordination: normal  Heel to shin coordination: normal  Tandem walking coordination: normal    Tremor   Resting tremor: absent  Intention tremor: absent  Action tremor: absent    Reflexes   Right brachioradialis: 2+  Left brachioradialis: 2+  Right biceps: 2+  Left biceps: 2+  Right triceps: 2+  Left triceps: 2+  Right patellar: 2+  Left patellar: 2+  Right achilles: 2+  Left achilles: 2+  Right Laws: absent  Left Laws: absent  Right ankle clonus: absent  Left ankle clonus: absent      Provider dictation:  47 year old  female is referred by Lita Corcoran for evaluation of leg pain.  Her current pain started greater than 2 years ago without focal trauma.  She feels pain in the bilateral buttocks radiating to the anterior thighs only when transitions from a seated to a standing position.  She recalls falling if she tries to get up too quickly as if to run.  She denies focal back pain and any further radicular leg pain, numbness tingling.  She has taken gabapentin in the past with some benefit.  She is currently taking etodalac.  She has not had PT or ELI.    Oswestry score: 30%.  PHQ:  9.    She is neurologically intact on exam with full strength throughout the upper and lower extremities.    I have reviewed xrays of the lumbar demonstrating mild disc space narrowing at L5/S1.  Oblique views suggest right L5 pars defect, but it is not supported on other views.    Ms. Wheatley has bilateral anterior thigh pain with subjective weakness/ pain upon standing.  It is  unclear if this is a true radiculopathy or neurological issue.  We will further assess with flexion/ extension xrays and an MRI lumbar spine. She requests valium for the MRI due to anxiety.  I have prescribed gabapentin as it has helped in the past.  Follow up after imaging is complete.    Visit Diagnosis:  Pain in both thighs  -     X-Ray Lumbar Spine Flexion And Extension Only; Future; Expected date: 03/05/2018  -     MRI Lumbar Spine Without Contrast; Future; Expected date: 03/05/2018  -     gabapentin (NEURONTIN) 300 MG capsule; Take 1 capsule (300 mg total) by mouth 3 (three) times daily.  Dispense: 90 capsule; Refill: 2  -     diazePAM (VALIUM) 10 MG Tab; Take 1 tablet (10 mg total) by mouth once. 30 minutes prior to MRI.  Must have someone drive you to and from the faciliyt.  Dispense: 1 tablet; Refill: 0    Chronic bilateral low back pain, with sciatica presence unspecified  -     X-Ray Lumbar Spine Flexion And Extension Only; Future; Expected date: 03/05/2018  -     MRI Lumbar Spine Without Contrast; Future; Expected date: 03/05/2018  -     gabapentin (NEURONTIN) 300 MG capsule; Take 1 capsule (300 mg total) by mouth 3 (three) times daily.  Dispense: 90 capsule; Refill: 2  -     diazePAM (VALIUM) 10 MG Tab; Take 1 tablet (10 mg total) by mouth once. 30 minutes prior to MRI.  Must have someone drive you to and from the faciliyt.  Dispense: 1 tablet; Refill: 0        Total time spent counseling greater than fifty percent of total visit time.  Counseling included discussion regarding imaging findings, diagnosis possibilities, treatment options, risks and benefits.   The patient had many questions regarding the options and long-term effects.

## 2018-04-30 DIAGNOSIS — G25.81 RESTLESS LEG SYNDROME: ICD-10-CM

## 2018-04-30 DIAGNOSIS — E03.4 HYPOTHYROIDISM DUE TO ACQUIRED ATROPHY OF THYROID: ICD-10-CM

## 2018-04-30 RX ORDER — ROPINIROLE 0.5 MG/1
1 TABLET, FILM COATED ORAL NIGHTLY
Qty: 60 TABLET | Refills: 0 | Status: SHIPPED | OUTPATIENT
Start: 2018-04-30 | End: 2018-05-29 | Stop reason: SDUPTHER

## 2018-04-30 RX ORDER — LEVOTHYROXINE SODIUM 200 UG/1
200 TABLET ORAL DAILY
Qty: 90 TABLET | OUTPATIENT
Start: 2018-04-30

## 2018-05-07 RX ORDER — ONDANSETRON HYDROCHLORIDE 8 MG/1
TABLET, FILM COATED ORAL
Qty: 30 TABLET | Refills: 0 | Status: SHIPPED | OUTPATIENT
Start: 2018-05-07 | End: 2018-06-12 | Stop reason: SDUPTHER

## 2018-05-16 ENCOUNTER — TELEPHONE (OUTPATIENT)
Dept: FAMILY MEDICINE | Facility: CLINIC | Age: 48
End: 2018-05-16

## 2018-05-16 DIAGNOSIS — E03.4 HYPOTHYROIDISM DUE TO ACQUIRED ATROPHY OF THYROID: ICD-10-CM

## 2018-05-16 RX ORDER — LEVOTHYROXINE SODIUM 200 UG/1
200 TABLET ORAL DAILY
Qty: 30 TABLET | Refills: 0 | Status: SHIPPED | OUTPATIENT
Start: 2018-05-16 | End: 2018-05-21 | Stop reason: ALTCHOICE

## 2018-05-16 NOTE — TELEPHONE ENCOUNTER
Patient states that she will come in and have labs done. States that she will just walk in. Did not want to schedule at this time.

## 2018-05-22 NOTE — TELEPHONE ENCOUNTER
Patient was in Er and requesting follow up with Dr Haile only. Scheduled to see PCP on Friday. Advised to return to ER if symptoms worsen before appt.

## 2018-05-23 RX ORDER — FLUTICASONE PROPIONATE AND SALMETEROL 50; 250 UG/1; UG/1
POWDER RESPIRATORY (INHALATION)
Qty: 60 EACH | Refills: 1 | Status: SHIPPED | OUTPATIENT
Start: 2018-05-23 | End: 2018-08-10 | Stop reason: SDUPTHER

## 2018-05-29 DIAGNOSIS — G25.81 RESTLESS LEG SYNDROME: ICD-10-CM

## 2018-05-30 RX ORDER — ROPINIROLE 0.5 MG/1
TABLET, FILM COATED ORAL
Qty: 60 TABLET | Refills: 5 | Status: SHIPPED | OUTPATIENT
Start: 2018-05-30 | End: 2018-10-10 | Stop reason: SDUPTHER

## 2018-06-12 RX ORDER — ONDANSETRON HYDROCHLORIDE 8 MG/1
TABLET, FILM COATED ORAL
Qty: 30 TABLET | Refills: 0 | Status: SHIPPED | OUTPATIENT
Start: 2018-06-12 | End: 2020-05-06 | Stop reason: SDUPTHER

## 2018-08-10 RX ORDER — FLUTICASONE PROPIONATE AND SALMETEROL 50; 250 UG/1; UG/1
POWDER RESPIRATORY (INHALATION)
Qty: 1 EACH | Refills: 1 | Status: SHIPPED | OUTPATIENT
Start: 2018-08-10 | End: 2020-06-01 | Stop reason: SDUPTHER

## 2018-09-20 ENCOUNTER — LAB VISIT (OUTPATIENT)
Dept: LAB | Facility: HOSPITAL | Age: 48
End: 2018-09-20
Attending: FAMILY MEDICINE
Payer: MEDICARE

## 2018-09-20 ENCOUNTER — TELEPHONE (OUTPATIENT)
Dept: FAMILY MEDICINE | Facility: CLINIC | Age: 48
End: 2018-09-20

## 2018-09-20 DIAGNOSIS — E03.4 HYPOTHYROIDISM DUE TO ACQUIRED ATROPHY OF THYROID: ICD-10-CM

## 2018-09-20 DIAGNOSIS — Z13.6 SCREENING FOR ISCHEMIC HEART DISEASE: ICD-10-CM

## 2018-09-20 DIAGNOSIS — Z13.6 SCREENING FOR ISCHEMIC HEART DISEASE: Primary | ICD-10-CM

## 2018-09-20 LAB
CHOLEST SERPL-MCNC: 199 MG/DL
CHOLEST/HDLC SERPL: 2.8 {RATIO}
HDLC SERPL-MCNC: 72 MG/DL
HDLC SERPL: 36.2 %
LDLC SERPL CALC-MCNC: 96.4 MG/DL
NONHDLC SERPL-MCNC: 127 MG/DL
T4 FREE SERPL-MCNC: 1.13 NG/DL
TRIGL SERPL-MCNC: 153 MG/DL
TSH SERPL DL<=0.005 MIU/L-ACNC: 0.24 UIU/ML

## 2018-09-20 PROCEDURE — 80061 LIPID PANEL: CPT

## 2018-09-20 PROCEDURE — 84443 ASSAY THYROID STIM HORMONE: CPT

## 2018-09-20 PROCEDURE — 84439 ASSAY OF FREE THYROXINE: CPT

## 2018-09-20 PROCEDURE — 36415 COLL VENOUS BLD VENIPUNCTURE: CPT | Mod: PO

## 2018-09-20 NOTE — TELEPHONE ENCOUNTER
Added lipid order. Linked to appt. Verified with lab. Attempted to contact patient. LM to return call to clinic.

## 2018-09-20 NOTE — TELEPHONE ENCOUNTER
----- Message from August Vasquez sent at 9/20/2018  8:32 AM CDT -----  Contact: same  Unsuccessful call placed to office.  Patient called in and stated she came in to drop off specimen & was just on phone with nurse & call dropped.  Patient call back number is [phone]

## 2018-09-20 NOTE — TELEPHONE ENCOUNTER
----- Message from Juan Yang sent at 9/20/2018  7:09 AM CDT -----  Contact: Noreen  Pt came and had labs drawn today, states there wasn't an order in for cholesterol testing. Pt wants an order sent to labs to have her cholesterol tested also. Could someone give pt a call and let her know if this will be added, verified pt's phone number in chart is correct.

## 2018-09-21 RX ORDER — LEVOTHYROXINE SODIUM 175 UG/1
175 TABLET ORAL DAILY
Qty: 90 TABLET | Refills: 5 | Status: ON HOLD | OUTPATIENT
Start: 2018-09-21 | End: 2019-01-08 | Stop reason: DRUGHIGH

## 2018-09-21 NOTE — TELEPHONE ENCOUNTER
----- Message from Fanny Garrison sent at 9/21/2018  3:54 PM CDT -----  Contact: Patient  Type:  Patient Returning Call    Who Called:  patient  Who Left Message for Patient:  NA  Does the patient know what this is regarding?:  Test results  Best Call Back Number:  [phone] (home)

## 2018-09-21 NOTE — TELEPHONE ENCOUNTER
Spoke to patient. See results note. Patient says she needs a refill on synthroid sent to [facility].

## 2018-09-27 ENCOUNTER — TELEPHONE (OUTPATIENT)
Dept: ADMINISTRATIVE | Facility: HOSPITAL | Age: 48
End: 2018-09-27

## 2018-10-10 ENCOUNTER — OFFICE VISIT (OUTPATIENT)
Dept: FAMILY MEDICINE | Facility: CLINIC | Age: 48
End: 2018-10-10
Payer: MEDICARE

## 2018-10-10 VITALS
RESPIRATION RATE: 18 BRPM | WEIGHT: 178.56 LBS | HEART RATE: 98 BPM | HEIGHT: 67 IN | BODY MASS INDEX: 28.02 KG/M2 | OXYGEN SATURATION: 97 % | SYSTOLIC BLOOD PRESSURE: 110 MMHG | TEMPERATURE: 98 F | DIASTOLIC BLOOD PRESSURE: 70 MMHG

## 2018-10-10 DIAGNOSIS — G25.81 RLS (RESTLESS LEGS SYNDROME): ICD-10-CM

## 2018-10-10 DIAGNOSIS — E78.5 HYPERLIPIDEMIA, UNSPECIFIED HYPERLIPIDEMIA TYPE: ICD-10-CM

## 2018-10-10 DIAGNOSIS — E03.4 HYPOTHYROIDISM DUE TO ACQUIRED ATROPHY OF THYROID: Primary | ICD-10-CM

## 2018-10-10 DIAGNOSIS — G25.81 RESTLESS LEG SYNDROME: ICD-10-CM

## 2018-10-10 DIAGNOSIS — F17.200 SMOKING: ICD-10-CM

## 2018-10-10 DIAGNOSIS — J44.9 CHRONIC OBSTRUCTIVE PULMONARY DISEASE, UNSPECIFIED COPD TYPE: ICD-10-CM

## 2018-10-10 PROCEDURE — 99215 OFFICE O/P EST HI 40 MIN: CPT | Mod: PBBFAC,PO | Performed by: FAMILY MEDICINE

## 2018-10-10 PROCEDURE — 99214 OFFICE O/P EST MOD 30 MIN: CPT | Mod: S$PBB,,, | Performed by: FAMILY MEDICINE

## 2018-10-10 PROCEDURE — 99999 PR PBB SHADOW E&M-EST. PATIENT-LVL V: CPT | Mod: PBBFAC,,, | Performed by: FAMILY MEDICINE

## 2018-10-10 PROCEDURE — 3008F BODY MASS INDEX DOCD: CPT | Mod: CPTII,,, | Performed by: FAMILY MEDICINE

## 2018-10-10 RX ORDER — ROPINIROLE 0.5 MG/1
1 TABLET, FILM COATED ORAL NIGHTLY
Qty: 60 TABLET | Refills: 5 | Status: SHIPPED | OUTPATIENT
Start: 2018-10-10 | End: 2020-05-06

## 2018-10-10 RX ORDER — GABAPENTIN 800 MG/1
800 TABLET ORAL 3 TIMES DAILY
Qty: 90 TABLET | Refills: 5 | Status: SHIPPED | OUTPATIENT
Start: 2018-10-10 | End: 2020-05-04 | Stop reason: SDUPTHER

## 2018-10-10 RX ORDER — ROSUVASTATIN CALCIUM 10 MG/1
10 TABLET, COATED ORAL NIGHTLY
Qty: 90 TABLET | Refills: 3 | Status: SHIPPED | OUTPATIENT
Start: 2018-10-10 | End: 2020-05-04 | Stop reason: SDUPTHER

## 2018-10-10 RX ORDER — CLONAZEPAM 1 MG/1
1 TABLET ORAL 2 TIMES DAILY
Refills: 0 | Status: CANCELLED | OUTPATIENT
Start: 2018-10-10

## 2018-10-10 RX ORDER — CEPHALEXIN 500 MG/1
CAPSULE ORAL
Refills: 0 | COMMUNITY
Start: 2018-08-08 | End: 2019-01-07

## 2018-10-10 RX ORDER — ARIPIPRAZOLE 10 MG/1
10 TABLET ORAL DAILY
Refills: 1 | COMMUNITY
Start: 2018-09-28 | End: 2020-05-06

## 2018-10-10 RX ORDER — NALTREXONE HYDROCHLORIDE 50 MG/1
50 TABLET, FILM COATED ORAL DAILY
Refills: 0 | COMMUNITY
Start: 2018-09-28 | End: 2019-01-07

## 2018-10-10 RX ORDER — AMITRIPTYLINE HYDROCHLORIDE 100 MG/1
100 TABLET ORAL NIGHTLY
Refills: 1 | COMMUNITY
Start: 2018-09-28 | End: 2020-05-06

## 2018-10-10 RX ORDER — BUSPIRONE HYDROCHLORIDE 10 MG/1
10 TABLET ORAL 3 TIMES DAILY
Refills: 1 | COMMUNITY
Start: 2018-09-28 | End: 2020-05-06

## 2018-10-10 RX ORDER — NALTREXONE 380 MG
KIT INTRAMUSCULAR
Refills: 1 | COMMUNITY
Start: 2018-09-28 | End: 2020-05-06

## 2018-10-10 NOTE — PROGRESS NOTES
Subjective:       Patient ID: Noreen Wheatley is a 48 y.o. female.    Chief Complaint: Follow-up    HPI Comments: Here today to f/u on chronic health issues.    Hypothyroidism - she is tolerating Synthroid 175 mcg daily  HLD - she is taking Crestor 10mg daily  Bipolar, anxiety - Actively working with Dr. Arias, psychiatry. He is prescribing her pscyhiatric medications.  RLS - taking gabapentin 800mg TID, Requip 0.5mg QHS  Asthma - using ventolin PRN  Taking Suboxone through Dr. Regulo De La Cruz  She reports she is overall doing well.      Past Medical History:   Diagnosis Date    Abnormal Pap smear     19 years old//per patient normal since    Anemia     Anxiety     Asthma, persistent controlled     Bipolar 1 disorder     Cervical dysplasia     Chronic constipation     COPD (chronic obstructive pulmonary disease)     Depression     GERD (gastroesophageal reflux disease)     HLD (hyperlipidemia)     Hypothyroidism     Panic attack        Past Surgical History:   Procedure Laterality Date     SECTION      leep      melanoma excision      right nostril    tubal ligation      TUBAL LIGATION         Review of patient's allergies indicates:  No Known Allergies    Social History     Socioeconomic History    Marital status: Legally      Spouse name: Not on file    Number of children: 2    Years of education: Not on file    Highest education level: Not on file   Social Needs    Financial resource strain: Not on file    Food insecurity - worry: Not on file    Food insecurity - inability: Not on file    Transportation needs - medical: Not on file    Transportation needs - non-medical: Not on file   Occupational History    Occupation: disability     Employer: [employer]   Tobacco Use    Smoking status: Current Some Day Smoker     Packs/day: 1.50     Years: 15.00     Pack years: 22.50     Types: Cigarettes    Smokeless tobacco: Never Used   Substance and Sexual Activity    Alcohol use:  No    Drug use: No    Sexual activity: No     Birth control/protection: None   Other Topics Concern    Not on file   Social History Narrative    So with Cuzan disease       Current Outpatient Medications on File Prior to Visit   Medication Sig Dispense Refill    ADVAIR DISKUS 250-50 mcg/dose diskus inhaler INHALE ONE PUFF INTO THE LUNGS TWICE DAILY 1 each 1    albuterol (PROVENTIL) 2.5 mg /3 mL (0.083 %) nebulizer solution Take 3 mLs (2.5 mg total) by nebulization every 6 (six) hours as needed for Wheezing. Rescue 75 mL 0    albuterol (VENTOLIN HFA) 90 mcg/actuation inhaler INHALE TWO PUFFS INTO THE LUNGS EVERY SIX HOURS 18 g 1    amitriptyline (ELAVIL) 100 MG tablet Take 100 mg by mouth nightly.  1    busPIRone (BUSPAR) 10 MG tablet Take 10 mg by mouth 3 (three) times daily.  1    cephALEXin (KEFLEX) 500 MG capsule   0    escitalopram oxalate (LEXAPRO) 20 MG tablet Take 20 mg by mouth once daily.  5    FLUCELVAX QUAD 6661-6040 60 mcg (15 mcg x 4)/0.5 mL vaccine INJECT ONE DOSE INTRAMUSCULARLY INTO THE LEFT OR RIGHT ARM AS A ONE TIME DOSE  0    guanFACINE (TENEX) 2 MG tablet Take 2 mg by mouth nightly.  5    levothyroxine (SYNTHROID, LEVOTHROID) 175 MCG tablet Take 1 tablet (175 mcg total) by mouth once daily. 90 tablet 5    meclizine (ANTIVERT) 25 mg tablet Take 1 tablet (25 mg total) by mouth 3 (three) times daily as needed. 90 tablet 0    naltrexone (DEPADE) 50 mg tablet Take 50 mg by mouth once daily.  0    ondansetron (ZOFRAN) 8 MG tablet TAKE ONE TABLET BY MOUTH EVERY EIGHT HOURS AS NEEDED FOR NAUSEA 30 tablet 0    polyethylene glycol (GLYCOLAX) 17 gram/dose powder USE one SCOOP in liquid one TO two TIMES daily  0    VIVITROL 380 mg SSRR kit Inject ONE ML ONCE A MONTH  1    ARIPiprazole (ABILIFY) 5 MG Tab Take 5 mg by mouth once daily.  1     No current facility-administered medications on file prior to visit.        Family History   Problem Relation Age of Onset    Thyroid disease Mother   "   Breast cancer Mother 50    Diabetes Father     Hyperlipidemia Father     Diabetes Sister     Cancer Sister         liver    Liver cancer Sister     Thyroid disease Sister        Review of Systems   Constitutional: Negative for fever, chills, activity change, appetite change, fatigue.   HENT: Negative for ear pain, nosebleeds, congestion, sore throat, rhinorrhea, trouble swallowing, neck pain and ear discharge.    Eyes: Negative for pain, discharge and visual disturbance.   Respiratory: Negative for choking, chest tightness, shortness of breath.   Cardiovascular: Negative for chest pain, palpitations and leg swelling.   Gastrointestinal: Negative for nausea, vomiting, abdominal pain, diarrhea, constipation, blood in stool.  Genitourinary: Negative for dysuria, urgency, frequency, hematuria, vaginal bleeding, vaginal discharge, difficulty urinating, vaginal pain, menstrual problem and pelvic pain.   Skin: Negative for color change, rash and wound.   Neurological: Negative for dizziness, tremors, seizures, syncope, weakness, light-headedness, numbness and headaches.   Hematological: Negative for adenopathy. Does not bruise/bleed easily.   Psychiatric/Behavioral: Negative for suicidal ideas, hallucinations, behavioral problems, confusion, disturbed wake/sleep cycle, dysphoric mood, decreased concentration and agitation.       Objective:      /70 (BP Location: Left arm, Patient Position: Sitting)   Pulse 98   Temp 98.3 °F (36.8 °C)   Resp 18   Ht 5' 7" (1.702 m)   Wt 81 kg (178 lb 9.2 oz)   SpO2 97%   BMI 27.97 kg/m²     Physical Exam   Constitutional: She is oriented to person, place, and time. She appears well-developed and well-nourished. She is active.    Mouth/Throat: Oropharynx is clear and moist.   Eyes: Conjunctivae, EOM and lids are normal. Pupils are equal, round, and reactive to light. Right eye exhibits no discharge. Left eye exhibits no discharge. Right conjunctiva is not injected. " Left conjunctiva is not injected. No scleral icterus.   Neck: Trachea normal, normal range of motion and full passive range of motion without pain. Neck supple. Normal carotid pulses and no JVD present. Carotid bruit is not present. No edema and no erythema present. No mass and no thyromegaly present.   Cardiovascular: Normal rate, regular rhythm, S1 normal, S2 normal, normal heart sounds and intact distal pulses.  Exam reveals no gallop and no friction rub.    No murmur heard.  Pulmonary/Chest: Effort normal and breath sounds normal. No respiratory distress. She has no wheezes. She has no rales.   Musculoskeletal: Normal range of motion. She exhibits no edema and no tenderness.   Neurological: She is alert and oriented to person, place, and time. She has normal strength. No cranial nerve deficit. Coordination normal.   Skin: Skin is warm, dry and intact. No rash noted. No cyanosis. Nails show no clubbing.   Psychiatric: She has a normal mood and affect.       Results for orders placed or performed in visit on 09/27/18    PAP SMEAR   Result Value Ref Range    Pap Negative for intraephithelial lesion or malignancy Negative for intraephithelial lesion or malignancy, Other     Labs 9/20/18 reviewed    Assessment:       1. Hypothyroidism due to acquired atrophy of thyroid    2. Hyperlipidemia, unspecified hyperlipidemia type    3. Smoking    4. RLS (restless legs syndrome)    5. Restless leg syndrome    6. Chronic obstructive pulmonary disease, unspecified COPD type        Plan:       Hypothyroidism due to acquired atrophy of thyroid  -     TSH; Future; Expected date: 04/08/2019    Hyperlipidemia, unspecified hyperlipidemia type  -     rosuvastatin (CRESTOR) 10 MG tablet; Take 1 tablet (10 mg total) by mouth nightly.  Dispense: 90 tablet; Refill: 3  -     Comprehensive metabolic panel; Future; Expected date: 04/08/2019  -     Lipid panel; Future; Expected date: 04/08/2019    Smoking    RLS (restless legs syndrome)  -      gabapentin (NEURONTIN) 800 MG tablet; Take 1 tablet (800 mg total) by mouth 3 (three) times daily.  Dispense: 90 tablet; Refill: 5    Restless leg syndrome  -     rOPINIRole (REQUIP) 0.5 MG tablet; Take 2 tablets (1 mg total) by mouth every evening.  Dispense: 60 tablet; Refill: 5    Chronic obstructive pulmonary disease, unspecified COPD type    Other orders  -     Cancel: clonazePAM (KLONOPIN) 1 MG tablet; Take 1 tablet (1 mg total) by mouth 2 (two) times daily.; Refill: 0      Hypothyroidism (controlled)  Comments: continue Synthroid 175 mcg daily     HLD  continue Crestor 10mg   Continue low fat diet    Bipolar/depression  Continue f/u with psychiatrist and present meds    Asthma/tobacco use  Continue with weaning program    F/u 6 months with labs or PRN

## 2018-10-11 LAB
CHOLESTEROL, TOTAL: 171
CHOLESTEROL/HDL RATIO SCREEN: 3
HDLC SERPL-MCNC: 57 MG/DL
LDLC SERPL CALC-MCNC: 90 MG/DL
NON HDL CHOL. (LDL+VLDL): 114
TRIGL SERPL-MCNC: 147 MG/DL

## 2018-10-16 ENCOUNTER — HOSPITAL ENCOUNTER (OUTPATIENT)
Dept: RADIOLOGY | Facility: HOSPITAL | Age: 48
Discharge: HOME | End: 2018-10-16
Attending: FAMILY MEDICINE
Payer: MEDICARE

## 2018-10-16 DIAGNOSIS — Z12.39 SCREENING FOR BREAST CANCER: ICD-10-CM

## 2018-10-16 PROCEDURE — 77067 SCR MAMMO BI INCL CAD: CPT | Mod: 26,,, | Performed by: RADIOLOGY

## 2018-10-16 PROCEDURE — 77063 BREAST TOMOSYNTHESIS BI: CPT | Mod: 26,,, | Performed by: RADIOLOGY

## 2018-10-16 PROCEDURE — 77063 BREAST TOMOSYNTHESIS BI: CPT | Mod: TC,PO

## 2018-10-22 ENCOUNTER — TELEPHONE (OUTPATIENT)
Dept: FAMILY MEDICINE | Facility: CLINIC | Age: 48
End: 2018-10-22

## 2018-10-22 DIAGNOSIS — Z72.0 TOBACCO USE: Primary | ICD-10-CM

## 2018-10-22 RX ORDER — VARENICLINE TARTRATE 1 MG/1
1 TABLET, FILM COATED ORAL 2 TIMES DAILY
Qty: 60 TABLET | Refills: 2 | Status: SHIPPED | OUTPATIENT
Start: 2018-10-22 | End: 2020-05-06

## 2018-10-22 RX ORDER — VARENICLINE TARTRATE 0.5 (11)-1
KIT ORAL
Qty: 1 PACKAGE | Refills: 0 | Status: SHIPPED | OUTPATIENT
Start: 2018-10-22 | End: 2019-01-07 | Stop reason: CLARIF

## 2018-10-22 NOTE — TELEPHONE ENCOUNTER
----- Message from Antonieta Craig sent at 10/22/2018 10:04 AM CDT -----  Contact: pt            Name of Who is Calling: Noreen      What is the request in detail: requesting a call back in reference to getting Chantix ordered because she is out and the Smoking Cessation program that she goes to does not for Chnatix anymore. Please call and advise      Can the clinic reply by [facility]: yes      What Number to Call Back if not in [facility]: [phone]

## 2018-10-22 NOTE — TELEPHONE ENCOUNTER
Spoke with pt and informed her that her medication has been sent to her pharmacy. Pt verbalized understanding and thanks.

## 2018-11-27 ENCOUNTER — TELEPHONE (OUTPATIENT)
Dept: SMOKING CESSATION | Facility: CLINIC | Age: 48
End: 2018-11-27

## 2018-12-20 NOTE — TELEPHONE ENCOUNTER
----- Message from Marina Recinos sent at 5/22/2018  3:14 PM CDT -----  Patient states that she need to see the doctor in 1 week for a ER follow up visit.  Please call patient at [phone].   actual

## 2018-12-26 ENCOUNTER — TELEPHONE (OUTPATIENT)
Dept: ADMINISTRATIVE | Facility: HOSPITAL | Age: 48
End: 2018-12-26

## 2019-01-07 PROBLEM — R07.9 CHEST PAIN: Status: ACTIVE | Noted: 2019-01-07

## 2019-01-23 RX ORDER — VARENICLINE TARTRATE 1 MG/1
TABLET, FILM COATED ORAL
Qty: 60 TABLET | Refills: 0 | OUTPATIENT
Start: 2019-01-23

## 2019-01-23 NOTE — TELEPHONE ENCOUNTER
DEFER: Varenicline (Chantix) - recent visit to ED with chief complaint weakness, chest pain, tobacco use on 1/7/19. In transitions care f/u pt stated symptoms liekly due to stress and anxiety. Pt also reported to taking chantix maintenance regimen once daily only instead of twice daily as prescribed.     [Refill may not be appropriate considering 11 weeks worth supplied on original rx; pt may benefit from a follow-up appt for Chantix/somking cessation status].

## 2019-01-23 NOTE — PROGRESS NOTES
Refill Authorization Note     is requesting a refill authorization.    Brief assessment and rationale for refill: DEFER; outside of protocol/ recent ED visit / Needs Appt           Refills Authorized: Yes  If authorized number of refills: 0        Medication-related problems identified: Requires appointment  Medication Therapy Plan: recent ED visit; may benefit from fu appt; Needs Appt (f/u); Pt stated at ED visit she is only taking chantix qd, rather than BID as precsribed; outside of protocol; defer to you   Name and strength of medication: CHANTIX 1 mg Tab (varenicline)  How patient will take medication: t1t po bid   Medication reconciliation completed: No        Comments:     Appointment in past 12 months or upcoming 90 days  Last visit with authorizing provider:  Abel Haile MD:10/10/2018     Next visit with authorizing provider:   Abel Haile MD:4/10/2019

## 2019-01-25 RX ORDER — VARENICLINE TARTRATE 1 MG/1
TABLET, FILM COATED ORAL
Qty: 60 TABLET | Refills: 2 | OUTPATIENT
Start: 2019-01-25

## 2019-01-25 NOTE — TELEPHONE ENCOUNTER
Spoke to Sparkle at [facility] to remove auto refill request. Previously handled with inappropriate refill request. Follow-up appt for continued Chantix use scheduled for 4/19, but pt may need to consider coming in sooner.

## 2019-03-27 ENCOUNTER — PATIENT OUTREACH (OUTPATIENT)
Dept: ADMINISTRATIVE | Facility: HOSPITAL | Age: 49
End: 2019-03-27

## 2019-07-29 ENCOUNTER — PATIENT OUTREACH (OUTPATIENT)
Dept: ADMINISTRATIVE | Facility: HOSPITAL | Age: 49
End: 2019-07-29

## 2019-07-29 NOTE — PROGRESS NOTES
Health Maintenance Due   Topic Date Due    Pneumococcal Vaccine (Medium Risk) (1 of 1 - PPSV23) 09/06/1989     Chart review completed 07/29/2019  Updated HM with Pap/HPV

## 2020-05-04 ENCOUNTER — TELEPHONE (OUTPATIENT)
Dept: FAMILY MEDICINE | Facility: CLINIC | Age: 50
End: 2020-05-04

## 2020-05-04 DIAGNOSIS — G25.81 RLS (RESTLESS LEGS SYNDROME): ICD-10-CM

## 2020-05-04 DIAGNOSIS — E78.5 HYPERLIPIDEMIA, UNSPECIFIED HYPERLIPIDEMIA TYPE: ICD-10-CM

## 2020-05-04 RX ORDER — LEVOTHYROXINE SODIUM 200 UG/1
200 TABLET ORAL DAILY
Qty: 90 TABLET | Refills: 3 | Status: SHIPPED | OUTPATIENT
Start: 2020-05-04 | End: 2021-05-18

## 2020-05-04 RX ORDER — ARIPIPRAZOLE 15 MG/1
5 TABLET ORAL DAILY
Status: CANCELLED | OUTPATIENT
Start: 2020-05-04

## 2020-05-04 RX ORDER — GABAPENTIN 800 MG/1
800 TABLET ORAL 3 TIMES DAILY
Qty: 90 TABLET | Refills: 5 | Status: SHIPPED | OUTPATIENT
Start: 2020-05-04 | End: 2021-02-17

## 2020-05-04 RX ORDER — LISINOPRIL AND HYDROCHLOROTHIAZIDE 20; 25 MG/1; MG/1
1 TABLET ORAL DAILY
Qty: 90 TABLET | Refills: 3 | Status: SHIPPED | OUTPATIENT
Start: 2020-05-04 | End: 2020-06-01

## 2020-05-04 RX ORDER — CITALOPRAM 20 MG/1
20 TABLET, FILM COATED ORAL DAILY
Status: CANCELLED | OUTPATIENT
Start: 2020-05-04

## 2020-05-04 RX ORDER — ROSUVASTATIN CALCIUM 10 MG/1
40 TABLET, COATED ORAL NIGHTLY
Qty: 90 TABLET | Refills: 3 | Status: SHIPPED | OUTPATIENT
Start: 2020-05-04 | End: 2020-05-06 | Stop reason: SDUPTHER

## 2020-05-04 RX ORDER — GUANFACINE 2 MG/1
2 TABLET ORAL NIGHTLY
Refills: 5 | Status: CANCELLED | OUTPATIENT
Start: 2020-05-04

## 2020-05-04 NOTE — TELEPHONE ENCOUNTER
----- Message from Norma Hernandez sent at 5/4/2020 12:26 PM CDT -----  Type: Needs Medical Advice  Who Called:  Patient  Best Call Back Number: [phone] (home)     Additional Information: Has an appointment today at 2:00pm. Said they were waiting for office to call her back to let her know if you still want her to come in. Please call to advise.

## 2020-05-04 NOTE — TELEPHONE ENCOUNTER
Spoke to pt regarding rescheduling Annual visit. Pt unable to wait til June 1st due to she needs all her rx renewed. Please advise.

## 2020-05-04 NOTE — TELEPHONE ENCOUNTER
Patient requesting refill. Medication pended. Annual appointment Rescheduled for 06/01/2020. Please advise

## 2020-05-04 NOTE — TELEPHONE ENCOUNTER
----- Message from Melissa Valderrama sent at 5/4/2020  2:05 PM CDT -----  Type:  RX Refill Request    Who Called:  Patient   Refill or New Rx:  Refill  RX Name and Strength:     gabapentin (NEURONTIN) 800 MG tablet   How is the patient currently taking it? (ex. 1XDay): 3 x day  Is this a 30 day or 90 day RX:  90  Preferred Pharmacy with phone number:    [facility] [address], [place] [address]  [address] [facility] [address], [address] [address]  Phone: [phone] Fax: [phone]  Local or Mail Order:  local  Ordering Provider:  Abel Haile  Best Call Back Number:  [phone]  Additional Information:

## 2020-05-04 NOTE — TELEPHONE ENCOUNTER
I can renew her prescriptions now. Just find out what she needs and I will refill them now.  reschedule her actual visit anytime after 5/18

## 2020-05-05 ENCOUNTER — TELEPHONE (OUTPATIENT)
Dept: FAMILY MEDICINE | Facility: CLINIC | Age: 50
End: 2020-05-05

## 2020-05-05 ENCOUNTER — PATIENT MESSAGE (OUTPATIENT)
Dept: FAMILY MEDICINE | Facility: CLINIC | Age: 50
End: 2020-05-05

## 2020-05-05 NOTE — TELEPHONE ENCOUNTER
Spoke with patient. States she has been incarcerated for 15 months and hasn't seen her psychiatrist for 17 months. She was released 4/24/20.     Link sent to activate my chart to schedule virtual visit

## 2020-05-05 NOTE — TELEPHONE ENCOUNTER
----- Message from Tiny Lopez sent at 5/5/2020  4:41 PM CDT -----  Contact: pt  Type:  RX Refill Request    Who Called:  pt  Refill or New Rx:  refill  RX Name and Strength:  citalopram (CELEXA) 20 MG tablet  How is the patient currently taking it? (ex. 1XDay):  1 x day  Is this a 30 day or 90 day RX:  30  Preferred Pharmacy with phone number:    [facility] [address], [place] [address]  [address] [facility] [address], [address] [address]  Phone: [phone] Fax: [phone]    Local or Mail Order:  local  Ordering Provider: Dr Mic Del Rio Call Back Number:  [phone] (home)     Additional Information:  Pt states she received all medication request but this medication, she states she needs this stat not feeling well

## 2020-05-06 ENCOUNTER — OFFICE VISIT (OUTPATIENT)
Dept: FAMILY MEDICINE | Facility: CLINIC | Age: 50
End: 2020-05-06
Payer: MEDICARE

## 2020-05-06 DIAGNOSIS — R11.0 NAUSEA: ICD-10-CM

## 2020-05-06 DIAGNOSIS — E03.4 HYPOTHYROIDISM DUE TO ACQUIRED ATROPHY OF THYROID: ICD-10-CM

## 2020-05-06 DIAGNOSIS — F32.A DEPRESSION, UNSPECIFIED DEPRESSION TYPE: ICD-10-CM

## 2020-05-06 DIAGNOSIS — F41.9 ANXIETY: Primary | ICD-10-CM

## 2020-05-06 DIAGNOSIS — E78.5 HYPERLIPIDEMIA, UNSPECIFIED HYPERLIPIDEMIA TYPE: ICD-10-CM

## 2020-05-06 PROCEDURE — 99499 RISK ADDL DX/OHS AUDIT: ICD-10-PCS | Mod: 95,,, | Performed by: FAMILY MEDICINE

## 2020-05-06 PROCEDURE — 99499 UNLISTED E&M SERVICE: CPT | Mod: 95,,, | Performed by: FAMILY MEDICINE

## 2020-05-06 PROCEDURE — 99213 PR OFFICE/OUTPT VISIT, EST, LEVL III, 20-29 MIN: ICD-10-PCS | Mod: 95,,, | Performed by: FAMILY MEDICINE

## 2020-05-06 PROCEDURE — 99213 OFFICE O/P EST LOW 20 MIN: CPT | Mod: 95,,, | Performed by: FAMILY MEDICINE

## 2020-05-06 RX ORDER — GUANFACINE 2 MG/1
2 TABLET ORAL NIGHTLY
Qty: 60 TABLET | Refills: 3 | Status: SHIPPED | OUTPATIENT
Start: 2020-05-06 | End: 2020-08-03

## 2020-05-06 RX ORDER — ARIPIPRAZOLE 15 MG/1
15 TABLET ORAL DAILY
Qty: 30 TABLET | Refills: 3 | Status: SHIPPED | OUTPATIENT
Start: 2020-05-06 | End: 2020-09-23 | Stop reason: SDUPTHER

## 2020-05-06 RX ORDER — ONDANSETRON HYDROCHLORIDE 8 MG/1
TABLET, FILM COATED ORAL
Qty: 30 TABLET | Refills: 3 | Status: SHIPPED | OUTPATIENT
Start: 2020-05-06 | End: 2020-08-03

## 2020-05-06 RX ORDER — LEVOTHYROXINE SODIUM 25 UG/1
25 TABLET ORAL
Qty: 30 TABLET | Refills: 11 | Status: ON HOLD | OUTPATIENT
Start: 2020-05-06 | End: 2020-07-06 | Stop reason: HOSPADM

## 2020-05-06 RX ORDER — ROSUVASTATIN CALCIUM 40 MG/1
40 TABLET, COATED ORAL NIGHTLY
Qty: 30 TABLET | Refills: 3 | Status: SHIPPED | OUTPATIENT
Start: 2020-05-06

## 2020-05-06 RX ORDER — CITALOPRAM 20 MG/1
20 TABLET, FILM COATED ORAL DAILY
Qty: 30 TABLET | Refills: 3 | Status: SHIPPED | OUTPATIENT
Start: 2020-05-06 | End: 2020-09-23

## 2020-05-06 NOTE — PROGRESS NOTES
Subjective:       Patient ID: Noreen Wheatley is a 49 y.o. female.    The patient location is: home  The chief complaint leading to consultation is: anxiety/depression/nausea  Visit type: audiovisual  Total time spent with patient: 15  Each patient to whom he or she provides medical services by telemedicine is:  (1) informed of the relationship between the physician and patient and the respective role of any other health care provider with respect to management of the patient; and (2) notified that he or she may decline to receive medical services by telemedicine and may withdraw from such care at any time.    She got out of incarceration for 15 months and needs her meds refilled    She has been home for the last 6 days    Hypothyroidism - she is tolerating Synthroid 225 mcg daily  HLD - she is taking Crestor 40mg daily  Bipolar, anxiety - psychiatry in assisted was writing meds and needs these refilled  RLS - taking gabapentin 800mg TID  Asthma - using ventolin PRN      Past Medical History:   Diagnosis Date    Abnormal Pap smear     19 years old//per patient normal since    Anemia     Anxiety     Asthma, persistent controlled     Bipolar 1 disorder     Cervical dysplasia     Chronic constipation     COPD (chronic obstructive pulmonary disease)     Depression     GERD (gastroesophageal reflux disease)     HLD (hyperlipidemia)     Hypothyroidism     Panic attack        Past Surgical History:   Procedure Laterality Date     SECTION      leep      melanoma excision      right nostril    tubal ligation      TUBAL LIGATION         Review of patient's allergies indicates:  No Known Allergies    Social History     Socioeconomic History    Marital status: Legally      Spouse name: Not on file    Number of children: 2    Years of education: Not on file    Highest education level: Not on file   Occupational History    Occupation: disability     Employer: [employer]   Social Needs     Financial resource strain: Not on file    Food insecurity:     Worry: Not on file     Inability: Not on file    Transportation needs:     Medical: Not on file     Non-medical: Not on file   Tobacco Use    Smoking status: Current Some Day Smoker     Packs/day: 1.50     Years: 15.00     Pack years: 22.50     Types: Cigarettes    Smokeless tobacco: Never Used   Substance and Sexual Activity    Alcohol use: No    Drug use: No    Sexual activity: Never     Birth control/protection: None   Lifestyle    Physical activity:     Days per week: Not on file     Minutes per session: Not on file    Stress: Not on file   Relationships    Social connections:     Talks on phone: Not on file     Gets together: Not on file     Attends Taoism service: Not on file     Active member of club or organization: Not on file     Attends meetings of clubs or organizations: Not on file     Relationship status: Not on file   Other Topics Concern    Not on file   Social History Narrative    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 90 tablet 5    levothyroxine (SYNTHROID) 200 MCG tablet Take 1 tablet (200 mcg total) by mouth once daily. 90 tablet 3    lisinopriL-hydrochlorothiazide (PRINZIDE,ZESTORETIC) 20-25 mg Tab Take 1 tablet by mouth once daily. 90 tablet 3    meclizine (ANTIVERT) 25 mg tablet Take 1 tablet (25 mg total) by mouth 3 (three) times daily as needed. (Patient taking differently: Take 25 mg by  mouth 3 (three) times daily as needed for Dizziness. ) 90 tablet 0    multivit-min/iron/folic/utn998 (HAIR, SKIN AND NAILS ADVANCED ORAL) Take 1 tablet by mouth once daily.      polyethylene glycol (GLYCOLAX) 17 gram/dose powder USE one SCOOP in liquid one TO two TIMES daily  0    [DISCONTINUED] amitriptyline (ELAVIL) 100 MG tablet Take 100 mg by mouth nightly.  1    [DISCONTINUED] amoxicillin-clavulanate 875-125mg (AUGMENTIN) 875-125 mg per tablet Take 1 tablet by mouth 2 (two) times daily. 20 tablet 0    [DISCONTINUED] ARIPiprazole (ABILIFY) 10 MG Tab Take 10 mg by mouth once daily.   1    [DISCONTINUED] ARIPiprazole (ABILIFY) 15 MG Tab Take 5 mg by mouth once daily.      [DISCONTINUED] aspirin-acetaminophen-caffeine 250-250-65 mg (EXCEDRIN MIGRAINE) 250-250-65 mg per tablet Take 1 tablet by mouth every 6 (six) hours as needed for Pain.      [DISCONTINUED] busPIRone (BUSPAR) 10 MG tablet Take 10 mg by mouth 3 (three) times daily.  1    [DISCONTINUED] citalopram (CELEXA) 20 MG tablet Take 20 mg by mouth once daily.      [DISCONTINUED] escitalopram oxalate (LEXAPRO) 20 MG tablet Take 20 mg by mouth once daily.  5    [DISCONTINUED] guanFACINE (TENEX) 2 MG tablet Take 2 mg by mouth nightly.  5    [DISCONTINUED] ondansetron (ZOFRAN) 8 MG tablet TAKE ONE TABLET BY MOUTH EVERY EIGHT HOURS AS NEEDED FOR NAUSEA 30 tablet 0    [DISCONTINUED] rOPINIRole (REQUIP) 0.5 MG tablet Take 2 tablets (1 mg total) by mouth every evening. 60 tablet 5    [DISCONTINUED] rosuvastatin (CRESTOR) 10 MG tablet Take 4 tablets (40 mg total) by mouth nightly. 90 tablet 3    [DISCONTINUED] varenicline (CHANTIX) 1 mg Tab Take 1 tablet (1 mg total) by mouth 2 (two) times daily. (Patient taking differently: Take 1 mg by mouth once daily. ) 60 tablet 2    [DISCONTINUED] VIVITROL 380 mg SSRR kit Inject ONE ML ONCE A MONTH  1     No current facility-administered medications on file prior to visit.        Family History   Problem Relation  Age of Onset    Thyroid disease Mother     Breast cancer Mother 50    Diabetes Father     Hyperlipidemia Father     Diabetes Sister     Cancer Sister         liver    Liver cancer Sister     Thyroid disease Sister        Review of Systems   Constitutional: Negative for fever, chills, activity change, appetite change, fatigue.   HENT: Negative for ear pain, nosebleeds, congestion, sore throat, rhinorrhea, trouble swallowing, neck pain and ear discharge.    Eyes: Negative for pain, discharge and visual disturbance.   Respiratory: Negative for choking, chest tightness, shortness of breath.   Cardiovascular: Negative for chest pain, palpitations and leg swelling.   Gastrointestinal: Negative for nausea, vomiting, abdominal pain, diarrhea, constipation, blood in stool.  Genitourinary: Negative for dysuria, urgency, frequency, hematuria, vaginal bleeding, vaginal discharge, difficulty urinating, vaginal pain, menstrual problem and pelvic pain.   Skin: Negative for color change, rash and wound.   Neurological: Negative for dizziness, tremors, seizures, syncope, weakness, light-headedness, numbness and headaches.   Hematological: Negative for adenopathy. Does not bruise/bleed easily.   Psychiatric/Behavioral: Negative for suicidal ideas, hallucinations, behavioral problems, confusion, disturbed wake/sleep cycle, dysphoric mood, decreased concentration and agitation.       Objective:        Physical Exam   Constitutional: She is oriented to person, place, and time. She appears well-developed and well-nourished. She is active.    Mouth/Throat: Oropharynx is clear and moist.   Eyes: Conjunctivae, EOM and lids are normal.    Pulmonary/Chest: Effort normal  Neurological: She is alert and oriented to person, place, and time. She has normal strength. No cranial nerve deficit. Coordination normal.   Psychiatric: She has a normal mood and affect.         Assessment:       1. Anxiety    2. Hyperlipidemia, unspecified  hyperlipidemia type    3. Depression, unspecified depression type    4. Hypothyroidism due to acquired atrophy of thyroid    5. Nausea        Plan:       Anxiety  -     citalopram (CELEXA) 20 MG tablet; Take 1 tablet (20 mg total) by mouth once daily.  Dispense: 30 tablet; Refill: 3  -     guanFACINE (TENEX) 2 MG tablet; Take 1 tablet (2 mg total) by mouth nightly.  Dispense: 60 tablet; Refill: 3  -     ARIPiprazole (ABILIFY) 15 MG Tab; Take 1 tablet (15 mg total) by mouth once daily.  Dispense: 30 tablet; Refill: 3  -     Ambulatory referral/consult to Psychiatry; Future; Expected date: 05/13/2020    Hyperlipidemia, unspecified hyperlipidemia type  -     rosuvastatin (CRESTOR) 40 MG Tab; Take 1 tablet (40 mg total) by mouth nightly.  Dispense: 30 tablet; Refill: 3    Depression, unspecified depression type  -     Ambulatory referral/consult to Psychiatry; Future; Expected date: 05/13/2020    Hypothyroidism due to acquired atrophy of thyroid  -     levothyroxine (SYNTHROID) 25 MCG tablet; Take 1 tablet (25 mcg total) by mouth before breakfast.  Dispense: 30 tablet; Refill: 11    Nausea  -     ondansetron (ZOFRAN) 8 MG tablet; TAKE ONE TABLET BY MOUTH EVERY EIGHT HOURS AS NEEDED FOR NAUSEA  Dispense: 30 tablet; Refill: 3      Hypothyroidism (controlled)  Comments: continue Synthroid 225 mcg daily     HLD  continue Crestor 40mg   Continue low fat diet    Bipolar/depression  reestablish with psychiatrist    F/u in person as planned    Answers for HPI/ROS submitted by the patient on 5/6/2020   activity change: Yes  unexpected weight change: Yes  neck pain: No  hearing loss: No  rhinorrhea: No  trouble swallowing: No  eye discharge: No  visual disturbance: No  chest tightness: No  wheezing: No  chest pain: No  palpitations: No  blood in stool: No  constipation: No  vomiting: Yes  diarrhea: No  polydipsia: No  polyuria: No  difficulty urinating: No  hematuria: No  menstrual problem: No  dysuria: No  joint swelling:  No  arthralgias: No  headaches: No  weakness: No  confusion: No  dysphoric mood: Yes

## 2020-06-01 ENCOUNTER — OFFICE VISIT (OUTPATIENT)
Dept: FAMILY MEDICINE | Facility: CLINIC | Age: 50
End: 2020-06-01
Payer: MEDICARE

## 2020-06-01 VITALS
BODY MASS INDEX: 33.15 KG/M2 | HEART RATE: 71 BPM | HEIGHT: 67 IN | TEMPERATURE: 98 F | DIASTOLIC BLOOD PRESSURE: 58 MMHG | OXYGEN SATURATION: 97 % | WEIGHT: 211.19 LBS | RESPIRATION RATE: 18 BRPM | SYSTOLIC BLOOD PRESSURE: 98 MMHG

## 2020-06-01 DIAGNOSIS — R05.3 COUGH, PERSISTENT: ICD-10-CM

## 2020-06-01 DIAGNOSIS — K21.9 GASTROESOPHAGEAL REFLUX DISEASE, ESOPHAGITIS PRESENCE NOT SPECIFIED: ICD-10-CM

## 2020-06-01 DIAGNOSIS — E78.5 HYPERLIPIDEMIA, UNSPECIFIED HYPERLIPIDEMIA TYPE: ICD-10-CM

## 2020-06-01 DIAGNOSIS — F31.9 BIPOLAR 1 DISORDER: ICD-10-CM

## 2020-06-01 DIAGNOSIS — Z12.31 BREAST CANCER SCREENING BY MAMMOGRAM: ICD-10-CM

## 2020-06-01 DIAGNOSIS — E03.4 HYPOTHYROIDISM DUE TO ACQUIRED ATROPHY OF THYROID: ICD-10-CM

## 2020-06-01 DIAGNOSIS — E55.9 VITAMIN D DEFICIENCY: ICD-10-CM

## 2020-06-01 DIAGNOSIS — B36.9 FUNGAL DERMATITIS: ICD-10-CM

## 2020-06-01 DIAGNOSIS — H91.90 HEARING LOSS, UNSPECIFIED HEARING LOSS TYPE, UNSPECIFIED LATERALITY: ICD-10-CM

## 2020-06-01 DIAGNOSIS — G47.00 INSOMNIA, UNSPECIFIED TYPE: ICD-10-CM

## 2020-06-01 DIAGNOSIS — J45.998 ASTHMA, PERSISTENT CONTROLLED: ICD-10-CM

## 2020-06-01 DIAGNOSIS — I10 ESSENTIAL HYPERTENSION: ICD-10-CM

## 2020-06-01 DIAGNOSIS — Z72.0 TOBACCO USE: ICD-10-CM

## 2020-06-01 DIAGNOSIS — M54.16 LUMBAR BACK PAIN WITH RADICULOPATHY AFFECTING LOWER EXTREMITY: ICD-10-CM

## 2020-06-01 DIAGNOSIS — K59.00 CONSTIPATION, UNSPECIFIED CONSTIPATION TYPE: ICD-10-CM

## 2020-06-01 DIAGNOSIS — Z13.6 ENCOUNTER FOR SCREENING FOR CARDIOVASCULAR DISORDERS: Primary | ICD-10-CM

## 2020-06-01 DIAGNOSIS — Z12.11 COLON CANCER SCREENING: ICD-10-CM

## 2020-06-01 DIAGNOSIS — Z20.828 EXPOSURE TO HERPES: ICD-10-CM

## 2020-06-01 PROCEDURE — 99499 UNLISTED E&M SERVICE: CPT | Mod: S$GLB,,, | Performed by: NURSE PRACTITIONER

## 2020-06-01 PROCEDURE — 99999 PR PBB SHADOW E&M-EST. PATIENT-LVL V: CPT | Mod: PBBFAC,,, | Performed by: NURSE PRACTITIONER

## 2020-06-01 PROCEDURE — 99214 OFFICE O/P EST MOD 30 MIN: CPT | Mod: S$GLB,,, | Performed by: NURSE PRACTITIONER

## 2020-06-01 PROCEDURE — 99499 RISK ADDL DX/OHS AUDIT: ICD-10-PCS | Mod: S$GLB,,, | Performed by: NURSE PRACTITIONER

## 2020-06-01 PROCEDURE — 99214 PR OFFICE/OUTPT VISIT, EST, LEVL IV, 30-39 MIN: ICD-10-PCS | Mod: S$GLB,,, | Performed by: NURSE PRACTITIONER

## 2020-06-01 PROCEDURE — 3078F DIAST BP <80 MM HG: CPT | Mod: CPTII,S$GLB,, | Performed by: NURSE PRACTITIONER

## 2020-06-01 PROCEDURE — 3008F BODY MASS INDEX DOCD: CPT | Mod: CPTII,S$GLB,, | Performed by: NURSE PRACTITIONER

## 2020-06-01 PROCEDURE — 3074F PR MOST RECENT SYSTOLIC BLOOD PRESSURE < 130 MM HG: ICD-10-PCS | Mod: CPTII,S$GLB,, | Performed by: NURSE PRACTITIONER

## 2020-06-01 PROCEDURE — 3074F SYST BP LT 130 MM HG: CPT | Mod: CPTII,S$GLB,, | Performed by: NURSE PRACTITIONER

## 2020-06-01 PROCEDURE — 3078F PR MOST RECENT DIASTOLIC BLOOD PRESSURE < 80 MM HG: ICD-10-PCS | Mod: CPTII,S$GLB,, | Performed by: NURSE PRACTITIONER

## 2020-06-01 PROCEDURE — 3008F PR BODY MASS INDEX (BMI) DOCUMENTED: ICD-10-PCS | Mod: CPTII,S$GLB,, | Performed by: NURSE PRACTITIONER

## 2020-06-01 PROCEDURE — 99999 PR PBB SHADOW E&M-EST. PATIENT-LVL V: ICD-10-PCS | Mod: PBBFAC,,, | Performed by: NURSE PRACTITIONER

## 2020-06-01 RX ORDER — FLUTICASONE PROPIONATE AND SALMETEROL 250; 50 UG/1; UG/1
POWDER RESPIRATORY (INHALATION)
Qty: 1 EACH | Refills: 1 | Status: SHIPPED | OUTPATIENT
Start: 2020-06-01 | End: 2021-05-25

## 2020-06-01 RX ORDER — ALBUTEROL SULFATE 90 UG/1
AEROSOL, METERED RESPIRATORY (INHALATION)
Qty: 18 G | Refills: 1 | Status: SHIPPED | OUTPATIENT
Start: 2020-06-01 | End: 2020-06-15 | Stop reason: SDUPTHER

## 2020-06-01 RX ORDER — PANTOPRAZOLE SODIUM 40 MG/1
40 TABLET, DELAYED RELEASE ORAL DAILY
Qty: 30 TABLET | Refills: 11 | Status: SHIPPED | OUTPATIENT
Start: 2020-06-01 | End: 2020-08-03

## 2020-06-01 RX ORDER — POLYETHYLENE GLYCOL 3350 17 G/17G
POWDER, FOR SOLUTION ORAL
Qty: 510 G | Refills: 1 | Status: SHIPPED | OUTPATIENT
Start: 2020-06-01 | End: 2021-05-25

## 2020-06-01 RX ORDER — LISINOPRIL AND HYDROCHLOROTHIAZIDE 10; 12.5 MG/1; MG/1
1 TABLET ORAL DAILY
Qty: 90 TABLET | Refills: 3 | Status: SHIPPED | OUTPATIENT
Start: 2020-06-01 | End: 2020-08-03

## 2020-06-01 RX ORDER — CLOTRIMAZOLE AND BETAMETHASONE DIPROPIONATE 10; .64 MG/G; MG/G
CREAM TOPICAL NIGHTLY
Qty: 15 G | Refills: 0 | Status: SHIPPED | OUTPATIENT
Start: 2020-06-01 | End: 2020-06-15

## 2020-06-01 RX ORDER — TRAZODONE HYDROCHLORIDE 50 MG/1
50 TABLET ORAL NIGHTLY
Qty: 30 TABLET | Refills: 2 | Status: SHIPPED | OUTPATIENT
Start: 2020-06-01 | End: 2021-06-01

## 2020-06-01 NOTE — PATIENT INSTRUCTIONS
Schedule audiology, schedule mammogram, ct lung, schedule fasting labs and UA, mri of back, Bp recheck in 2 weeks, needs to see psychiatry

## 2020-06-01 NOTE — PROGRESS NOTES
Subjective:       Patient ID: Noreen Wheatley is a 49 y.o. female.    Chief Complaint: Routine Health Maintenance    Patient is here today for follow up with multiple complaints.  She needs labs to address her multiple chronic health conditions, see medical history, problem list, and medication list, as well as refills of her chronic medications. She needs referral to psychiatry as she does not feel her current medications for bipolar are effective, do request refill/restart of trazodone for sleep which she has taken in the past with success while on the current medications she is on. She is a smoker, has persistent cough, requests lung cancer screening and refill of her inhalers for asthma/copd.   Has noticed decreased hearing, gradually worsening, requests hearing test.  Has noticed red cracking areas in the corners of both lips for months.  Her partner is positive for Herpes, he is on preventative, she would like to be tested.   Complians of ongoing chronic progressive back pain, requests further evaluation with MRI and referral to pain management.    Past Medical History:  No date: Abnormal Pap smear      Comment:  19 years old//per patient normal since  No date: Anemia  No date: Anxiety  No date: Asthma, persistent controlled  No date: Bipolar 1 disorder  No date: Cervical dysplasia  No date: Chronic constipation  No date: COPD (chronic obstructive pulmonary disease)  No date: Depression  No date: GERD (gastroesophageal reflux disease)  No date: HLD (hyperlipidemia)  No date: Hypothyroidism  No date: Panic attack    Past Surgical History:  No date:  SECTION  No date: leep  No date: melanoma excision      Comment:  right nostril  No date: tubal ligation  No date: TUBAL LIGATION    Review of patient's family history indicates:  Problem: Thyroid disease      Relation: Mother          Age of Onset: (Not Specified)  Problem: Breast cancer      Relation: Mother          Age of Onset: 50  Problem: Diabetes       Relation: Father          Age of Onset: (Not Specified)  Problem: Hyperlipidemia      Relation: Father          Age of Onset: (Not Specified)  Problem: Diabetes      Relation: Sister          Age of Onset: (Not Specified)  Problem: Cancer      Relation: Sister          Age of Onset: (Not Specified)          Comment: liver  Problem: Liver cancer      Relation: Sister          Age of Onset: (Not Specified)  Problem: Thyroid disease      Relation: Sister          Age of Onset: (Not Specified)      Social History    Socioeconomic History      Marital status: Legally       Spouse name: Not on file      Number of children: 2      Years of education: Not on file      Highest education level: Not on file    Occupational History      Occupation: disability        Employer: [employer]    Social Needs      Financial resource strain: Not on file      Food insecurity:        Worry: Not on file        Inability: Not on file      Transportation needs:        Medical: Not on file        Non-medical: Not on file    Tobacco Use      Smoking status: Current Some Day Smoker        Packs/day: 1.50        Years: 15.00        Pack years: 22.5        Types: Cigarettes      Smokeless tobacco: Never Used    Substance and Sexual Activity      Alcohol use: No      Drug use: No      Sexual activity: Never        Birth control/protection: None    Lifestyle      Physical activity:        Days per week: Not on file        Minutes per session: Not on file      Stress: Not on file    Relationships      Social connections:        Talks on phone: Not on file        Gets together: Not on file        Attends Yazdanism service: Not on file        Active member of club or organization: Not on file        Attends meetings of clubs or organizations: Not on file        Relationship status: Not on file    Other Topics      Concerns:        Not on file    Social History Narrative      So with Cuzan disease      Current Outpatient  Medications:  albuterol (VENTOLIN HFA) 90 mcg/actuation inhaler, INHALE TWO PUFFS INTO THE LUNGS EVERY SIX HOURS, Disp: 18 g, Rfl: 1  ARIPiprazole (ABILIFY) 15 MG Tab, Take 1 tablet (15 mg total) by mouth once daily., Disp: 30 tablet, Rfl: 3  citalopram (CELEXA) 20 MG tablet, Take 1 tablet (20 mg total) by mouth once daily., Disp: 30 tablet, Rfl: 3  fluticasone-salmeterol diskus inhaler 250-50 mcg, INHALE ONE PUFF INTO THE LUNGS TWICE DAILY, Disp: 1 each, Rfl: 1  gabapentin (NEURONTIN) 800 MG tablet, Take 1 tablet (800 mg total) by mouth 3 (three) times daily., Disp: 90 tablet, Rfl: 5  guanFACINE (TENEX) 2 MG tablet, Take 1 tablet (2 mg total) by mouth nightly., Disp: 60 tablet, Rfl: 3  levothyroxine (SYNTHROID) 200 MCG tablet, Take 1 tablet (200 mcg total) by mouth once daily., Disp: 90 tablet, Rfl: 3  levothyroxine (SYNTHROID) 25 MCG tablet, Take 1 tablet (25 mcg total) by mouth before breakfast., Disp: 30 tablet, Rfl: 11  meclizine (ANTIVERT) 25 mg tablet, Take 1 tablet (25 mg total) by mouth 3 (three) times daily as needed. (Patient taking differently: Take 25 mg by mouth 3 (three) times daily as needed for Dizziness. ), Disp: 90 tablet, Rfl: 0  multivit-min/iron/folic/bir512 (HAIR, SKIN AND NAILS ADVANCED ORAL), Take 1 tablet by mouth once daily., Disp: , Rfl:   ondansetron (ZOFRAN) 8 MG tablet, TAKE ONE TABLET BY MOUTH EVERY EIGHT HOURS AS NEEDED FOR NAUSEA, Disp: 30 tablet, Rfl: 3  rosuvastatin (CRESTOR) 40 MG Tab, Take 1 tablet (40 mg total) by mouth nightly., Disp: 30 tablet, Rfl: 3  clotrimazole-betamethasone 1-0.05% (LOTRISONE) cream, Apply topically every evening. for 14 days, Disp: 15 g, Rfl: 0  lisinopriL-hydrochlorothiazide (PRINZIDE,ZESTORETIC) 10-12.5 mg per tablet, Take 1 tablet by mouth once daily., Disp: 90 tablet, Rfl: 3  pantoprazole (PROTONIX) 40 MG tablet, Take 1 tablet (40 mg total) by mouth once daily., Disp: 30 tablet, Rfl: 11  polyethylene glycol (GLYCOLAX) 17 gram/dose powder, USE one  SCOOP in liquid one TO two TIMES daily, Disp: 510 g, Rfl: 1  traZODone (DESYREL) 50 MG tablet, Take 1 tablet (50 mg total) by mouth every evening., Disp: 30 tablet, Rfl: 2    No current facility-administered medications for this visit.       Review of patient's allergies indicates:  No Known Allergies    Review of Systems   Constitutional: Positive for fatigue. Negative for chills, fever and unexpected weight change.   HENT: Positive for hearing loss, postnasal drip and rhinorrhea. Negative for sore throat.    Respiratory: Positive for cough, shortness of breath and wheezing. Negative for chest tightness.    Cardiovascular: Negative.  Negative for chest pain, palpitations and leg swelling.   Gastrointestinal: Positive for abdominal distention (bloating from constipation) and constipation. Negative for abdominal pain, diarrhea, nausea and vomiting.        Heartburn   Genitourinary: Positive for frequency. Negative for dysuria and urgency.   Musculoskeletal: Positive for back pain.   Skin: Negative for rash and wound.   Neurological: Positive for numbness.   Psychiatric/Behavioral: Positive for sleep disturbance. Negative for dysphoric mood. The patient is nervous/anxious.        Objective:      Physical Exam   Constitutional: She is oriented to person, place, and time. No distress.   HENT:   Head: Normocephalic and atraumatic.       Right Ear: External ear normal.   Left Ear: External ear normal.   Nose: Nose normal.   Mouth/Throat: Oropharynx is clear and moist.   Neck: Neck supple.   Cardiovascular: Normal rate and regular rhythm. Exam reveals no friction rub.   No murmur heard.  Pulmonary/Chest: Effort normal. She has wheezes.   Abdominal: Soft. Bowel sounds are normal. She exhibits no distension. There is no tenderness.   Musculoskeletal: She exhibits no edema.   Neurological: She is alert and oriented to person, place, and time.   Skin: No rash noted. She is not diaphoretic.   Psychiatric: Her mood appears  anxious. Thought content is not paranoid and not delusional. She expresses no homicidal and no suicidal ideation. She expresses no suicidal plans and no homicidal plans.       Assessment:       1. Encounter for screening for cardiovascular disorders    2. Hypothyroidism due to acquired atrophy of thyroid    3. Hyperlipidemia, unspecified hyperlipidemia type    4. Asthma, persistent controlled    5. Bipolar 1 disorder    6. Constipation, unspecified constipation type    7. Vitamin D deficiency    8. Cough, persistent    9. Tobacco use    10. Breast cancer screening by mammogram    11. Colon cancer screening    12. Hearing loss, unspecified hearing loss type, unspecified laterality    13. Insomnia, unspecified type    14. Exposure to herpes    15. Gastroesophageal reflux disease, esophagitis presence not specified    16. Lumbar back pain with radiculopathy affecting lower extremity    17. Essential hypertension    18. Fungal dermatitis        Plan:       1. Encounter for screening for cardiovascular disorders  Lipid scheduled.    2. Hypothyroidism due to acquired atrophy of thyroid  Labs scheduled.  - TSH; Future  - T4, free; Future    3. Hyperlipidemia, unspecified hyperlipidemia type  Labs scheduled,   - CBC auto differential; Future  - Comprehensive metabolic panel; Future  - Lipid Panel; Future    4. Asthma, persistent controlled  Refill sent, smoking cessation discussed.  - fluticasone-salmeterol diskus inhaler 250-50 mcg; INHALE ONE PUFF INTO THE LUNGS TWICE DAILY  Dispense: 1 each; Refill: 1  - albuterol (VENTOLIN HFA) 90 mcg/actuation inhaler; INHALE TWO PUFFS INTO THE LUNGS EVERY SIX HOURS  Dispense: 18 g; Refill: 1    5. Bipolar 1 disorder  Refer to psychiatry.  - Ambulatory referral/consult to Psychiatry; Future    6. Constipation, unspecified constipation type  Ongoing and chronic. Refill glycolax.  - polyethylene glycol (GLYCOLAX) 17 gram/dose powder; USE one SCOOP in liquid one TO two TIMES daily   Dispense: 510 g; Refill: 1    7. Vitamin D deficiency  Lab scheduled.  - Vitamin D; Future    8. Cough, persistent    - CT Chest Without Contrast; Future    9. Tobacco use  Smoking cessation discussed.  - CT Chest Without Contrast; Future    10. Breast cancer screening by mammogram    - Mammo Digital Screening Bilateral With CAD; Future    11. Colon cancer screening    - Case request GI: COLONOSCOPY    12. Hearing loss, unspecified hearing loss type, unspecified laterality    - Ambulatory referral/consult to Audiology; Future    13. Insomnia, unspecified type  Referral to psychiatry, medication safety and serotonin syndrome  discussed.   - traZODone (DESYREL) 50 MG tablet; Take 1 tablet (50 mg total) by mouth every evening.  Dispense: 30 tablet; Refill: 2    14. Exposure to herpes    - HERPES SIMPLEX 1&2 IGG; Future  - HERPES SIMPLEX 1 & 2 IGM; Future    15. Gastroesophageal reflux disease, esophagitis presence not specified  Refill protonix.  - pantoprazole (PROTONIX) 40 MG tablet; Take 1 tablet (40 mg total) by mouth once daily.  Dispense: 30 tablet; Refill: 11    16. Lumbar back pain with radiculopathy affecting lower extremity    - MRI Lumbar Spine Without Contrast; Future    17. Essential hypertension  Stable. Refill medications, schedule labs  - lisinopriL-hydrochlorothiazide (PRINZIDE,ZESTORETIC) 10-12.5 mg per tablet; Take 1 tablet by mouth once daily.  Dispense: 90 tablet; Refill: 3  - URINALYSIS    18. Fungal dermatitis  Follow up with dermatology if not resolving.   - clotrimazole-betamethasone 1-0.05% (LOTRISONE) cream; Apply topically every evening. for 14 days  Dispense: 15 g; Refill: 0

## 2020-06-03 ENCOUNTER — LAB VISIT (OUTPATIENT)
Dept: LAB | Facility: HOSPITAL | Age: 50
End: 2020-06-03
Attending: FAMILY MEDICINE
Payer: MEDICARE

## 2020-06-03 DIAGNOSIS — E55.9 VITAMIN D DEFICIENCY: ICD-10-CM

## 2020-06-03 DIAGNOSIS — E03.4 HYPOTHYROIDISM DUE TO ACQUIRED ATROPHY OF THYROID: ICD-10-CM

## 2020-06-03 DIAGNOSIS — E78.5 HYPERLIPIDEMIA, UNSPECIFIED HYPERLIPIDEMIA TYPE: ICD-10-CM

## 2020-06-03 DIAGNOSIS — Z20.828 EXPOSURE TO HERPES: ICD-10-CM

## 2020-06-03 LAB
ALBUMIN SERPL BCP-MCNC: 3.7 G/DL (ref 3.5–5.2)
ALP SERPL-CCNC: 54 U/L (ref 55–135)
ALT SERPL W/O P-5'-P-CCNC: 18 U/L (ref 10–44)
ANION GAP SERPL CALC-SCNC: 7 MMOL/L (ref 8–16)
AST SERPL-CCNC: 20 U/L (ref 10–40)
BASOPHILS # BLD AUTO: 0.03 K/UL (ref 0–0.2)
BASOPHILS NFR BLD: 0.3 % (ref 0–1.9)
BILIRUB SERPL-MCNC: 0.3 MG/DL (ref 0.1–1)
BUN SERPL-MCNC: 7 MG/DL (ref 6–20)
CALCIUM SERPL-MCNC: 10.1 MG/DL (ref 8.7–10.5)
CHLORIDE SERPL-SCNC: 104 MMOL/L (ref 95–110)
CHOLEST SERPL-MCNC: 125 MG/DL (ref 120–199)
CHOLEST/HDLC SERPL: 2.6 {RATIO} (ref 2–5)
CO2 SERPL-SCNC: 28 MMOL/L (ref 23–29)
CREAT SERPL-MCNC: 0.7 MG/DL (ref 0.5–1.4)
DIFFERENTIAL METHOD: ABNORMAL
EOSINOPHIL # BLD AUTO: 0 K/UL (ref 0–0.5)
EOSINOPHIL NFR BLD: 0.2 % (ref 0–8)
ERYTHROCYTE [DISTWIDTH] IN BLOOD BY AUTOMATED COUNT: 13.2 % (ref 11.5–14.5)
EST. GFR  (AFRICAN AMERICAN): >60 ML/MIN/1.73 M^2
EST. GFR  (NON AFRICAN AMERICAN): >60 ML/MIN/1.73 M^2
GLUCOSE SERPL-MCNC: 101 MG/DL (ref 70–110)
HCT VFR BLD AUTO: 37.2 % (ref 37–48.5)
HDLC SERPL-MCNC: 48 MG/DL (ref 40–75)
HDLC SERPL: 38.4 % (ref 20–50)
HGB BLD-MCNC: 11.8 G/DL (ref 12–16)
IMM GRANULOCYTES # BLD AUTO: 0.04 K/UL (ref 0–0.04)
IMM GRANULOCYTES NFR BLD AUTO: 0.4 % (ref 0–0.5)
LDLC SERPL CALC-MCNC: 56.2 MG/DL (ref 63–159)
LYMPHOCYTES # BLD AUTO: 1.1 K/UL (ref 1–4.8)
LYMPHOCYTES NFR BLD: 11.1 % (ref 18–48)
MCH RBC QN AUTO: 30.3 PG (ref 27–31)
MCHC RBC AUTO-ENTMCNC: 31.7 G/DL (ref 32–36)
MCV RBC AUTO: 95 FL (ref 82–98)
MONOCYTES # BLD AUTO: 0.9 K/UL (ref 0.3–1)
MONOCYTES NFR BLD: 9.4 % (ref 4–15)
NEUTROPHILS # BLD AUTO: 7.7 K/UL (ref 1.8–7.7)
NEUTROPHILS NFR BLD: 78.6 % (ref 38–73)
NONHDLC SERPL-MCNC: 77 MG/DL
NRBC BLD-RTO: 0 /100 WBC
PLATELET # BLD AUTO: 267 K/UL (ref 150–350)
PMV BLD AUTO: 11.2 FL (ref 9.2–12.9)
POTASSIUM SERPL-SCNC: 4.8 MMOL/L (ref 3.5–5.1)
PROT SERPL-MCNC: 6.7 G/DL (ref 6–8.4)
RBC # BLD AUTO: 3.9 M/UL (ref 4–5.4)
SODIUM SERPL-SCNC: 139 MMOL/L (ref 136–145)
T4 FREE SERPL-MCNC: 1.11 NG/DL (ref 0.71–1.51)
TRIGL SERPL-MCNC: 104 MG/DL (ref 30–150)
TSH SERPL DL<=0.005 MIU/L-ACNC: 1.25 UIU/ML (ref 0.4–4)
WBC # BLD AUTO: 9.82 K/UL (ref 3.9–12.7)

## 2020-06-03 PROCEDURE — 86694 HERPES SIMPLEX NES ANTBDY: CPT

## 2020-06-03 PROCEDURE — 86696 HERPES SIMPLEX TYPE 2 TEST: CPT

## 2020-06-03 PROCEDURE — 84443 ASSAY THYROID STIM HORMONE: CPT

## 2020-06-03 PROCEDURE — 36415 COLL VENOUS BLD VENIPUNCTURE: CPT | Mod: PO

## 2020-06-03 PROCEDURE — 80053 COMPREHEN METABOLIC PANEL: CPT

## 2020-06-03 PROCEDURE — 82306 VITAMIN D 25 HYDROXY: CPT

## 2020-06-03 PROCEDURE — 80061 LIPID PANEL: CPT

## 2020-06-03 PROCEDURE — 84439 ASSAY OF FREE THYROXINE: CPT

## 2020-06-03 PROCEDURE — 85025 COMPLETE CBC W/AUTO DIFF WBC: CPT

## 2020-06-04 DIAGNOSIS — D64.9 ANEMIA, UNSPECIFIED TYPE: Primary | ICD-10-CM

## 2020-06-04 LAB — 25(OH)D3+25(OH)D2 SERPL-MCNC: 35 NG/ML (ref 30–96)

## 2020-06-05 LAB
HSV1 IGG SERPL QL IA: POSITIVE
HSV2 IGG SERPL QL IA: NEGATIVE

## 2020-06-08 LAB — HSV AB, IGM BY EIA: 0.83 INDEX

## 2020-06-15 ENCOUNTER — OFFICE VISIT (OUTPATIENT)
Dept: FAMILY MEDICINE | Facility: CLINIC | Age: 50
End: 2020-06-15
Payer: MEDICARE

## 2020-06-15 ENCOUNTER — LAB VISIT (OUTPATIENT)
Dept: LAB | Facility: HOSPITAL | Age: 50
End: 2020-06-15
Attending: NURSE PRACTITIONER
Payer: MEDICARE

## 2020-06-15 VITALS
OXYGEN SATURATION: 96 % | DIASTOLIC BLOOD PRESSURE: 72 MMHG | HEIGHT: 67 IN | SYSTOLIC BLOOD PRESSURE: 120 MMHG | BODY MASS INDEX: 33.94 KG/M2 | HEART RATE: 74 BPM | WEIGHT: 216.25 LBS | TEMPERATURE: 99 F

## 2020-06-15 DIAGNOSIS — J45.998 ASTHMA, PERSISTENT CONTROLLED: ICD-10-CM

## 2020-06-15 DIAGNOSIS — K59.00 CONSTIPATION, UNSPECIFIED CONSTIPATION TYPE: ICD-10-CM

## 2020-06-15 DIAGNOSIS — F17.200 SMOKER: ICD-10-CM

## 2020-06-15 DIAGNOSIS — D64.9 ANEMIA, UNSPECIFIED TYPE: ICD-10-CM

## 2020-06-15 DIAGNOSIS — D64.9 ANEMIA, UNSPECIFIED TYPE: Primary | ICD-10-CM

## 2020-06-15 LAB
IRON SERPL-MCNC: 38 UG/DL (ref 30–160)
SATURATED IRON: 10 % (ref 20–50)
TOTAL IRON BINDING CAPACITY: 373 UG/DL (ref 250–450)
TRANSFERRIN SERPL-MCNC: 252 MG/DL (ref 200–375)

## 2020-06-15 PROCEDURE — 83540 ASSAY OF IRON: CPT

## 2020-06-15 PROCEDURE — 99999 PR PBB SHADOW E&M-EST. PATIENT-LVL V: ICD-10-PCS | Mod: PBBFAC,,, | Performed by: NURSE PRACTITIONER

## 2020-06-15 PROCEDURE — 3078F DIAST BP <80 MM HG: CPT | Mod: CPTII,S$GLB,, | Performed by: NURSE PRACTITIONER

## 2020-06-15 PROCEDURE — 99214 PR OFFICE/OUTPT VISIT, EST, LEVL IV, 30-39 MIN: ICD-10-PCS | Mod: S$GLB,,, | Performed by: NURSE PRACTITIONER

## 2020-06-15 PROCEDURE — 3008F BODY MASS INDEX DOCD: CPT | Mod: CPTII,S$GLB,, | Performed by: NURSE PRACTITIONER

## 2020-06-15 PROCEDURE — 3078F PR MOST RECENT DIASTOLIC BLOOD PRESSURE < 80 MM HG: ICD-10-PCS | Mod: CPTII,S$GLB,, | Performed by: NURSE PRACTITIONER

## 2020-06-15 PROCEDURE — 99214 OFFICE O/P EST MOD 30 MIN: CPT | Mod: S$GLB,,, | Performed by: NURSE PRACTITIONER

## 2020-06-15 PROCEDURE — 36415 COLL VENOUS BLD VENIPUNCTURE: CPT | Mod: PO

## 2020-06-15 PROCEDURE — 99999 PR PBB SHADOW E&M-EST. PATIENT-LVL V: CPT | Mod: PBBFAC,,, | Performed by: NURSE PRACTITIONER

## 2020-06-15 PROCEDURE — 3008F PR BODY MASS INDEX (BMI) DOCUMENTED: ICD-10-PCS | Mod: CPTII,S$GLB,, | Performed by: NURSE PRACTITIONER

## 2020-06-15 PROCEDURE — 3074F SYST BP LT 130 MM HG: CPT | Mod: CPTII,S$GLB,, | Performed by: NURSE PRACTITIONER

## 2020-06-15 PROCEDURE — 3074F PR MOST RECENT SYSTOLIC BLOOD PRESSURE < 130 MM HG: ICD-10-PCS | Mod: CPTII,S$GLB,, | Performed by: NURSE PRACTITIONER

## 2020-06-15 RX ORDER — ALBUTEROL SULFATE 90 UG/1
AEROSOL, METERED RESPIRATORY (INHALATION)
Qty: 18 G | Refills: 1 | Status: SHIPPED | OUTPATIENT
Start: 2020-06-15 | End: 2020-06-15 | Stop reason: SDUPTHER

## 2020-06-15 RX ORDER — ALBUTEROL SULFATE 90 UG/1
AEROSOL, METERED RESPIRATORY (INHALATION)
Qty: 18 G | Refills: 1 | Status: SHIPPED | OUTPATIENT
Start: 2020-06-15

## 2020-06-15 NOTE — PROGRESS NOTES
Subjective:       Patient ID: Noreen Wheatley is a 49 y.o. female.    Chief Complaint: Hypertension    Patient is here to discuss recent labs, slight anemia noted, labs otherwise looked good.. Having constipation not relieved by glycolax. She would like to attend smoking cessation.     Past Medical History:  No date: Abnormal Pap smear      Comment:  19 years old//per patient normal since  No date: Anemia  No date: Anxiety  No date: Asthma, persistent controlled  No date: Bipolar 1 disorder  No date: Cervical dysplasia  No date: Chronic constipation  No date: COPD (chronic obstructive pulmonary disease)  No date: Depression  No date: GERD (gastroesophageal reflux disease)  No date: HLD (hyperlipidemia)  No date: Hypothyroidism  No date: Panic attack    Past Surgical History:  No date:  SECTION  No date: leep  No date: melanoma excision      Comment:  right nostril  No date: tubal ligation  No date: TUBAL LIGATION    Review of patient's family history indicates:  Problem: Thyroid disease      Relation: Mother          Age of Onset: (Not Specified)  Problem: Breast cancer      Relation: Mother          Age of Onset: 50  Problem: Diabetes      Relation: Father          Age of Onset: (Not Specified)  Problem: Hyperlipidemia      Relation: Father          Age of Onset: (Not Specified)  Problem: Diabetes      Relation: Sister          Age of Onset: (Not Specified)  Problem: Cancer      Relation: Sister          Age of Onset: (Not Specified)          Comment: liver  Problem: Liver cancer      Relation: Sister          Age of Onset: (Not Specified)  Problem: Thyroid disease      Relation: Sister          Age of Onset: (Not Specified)      Social History    Socioeconomic History      Marital status: Legally       Spouse name: Not on file      Number of children: 2      Years of education: Not on file      Highest education level: Not on file    Occupational History      Occupation: disability        Employer:  [employer]    Social Needs      Financial resource strain: Not on file      Food insecurity        Worry: Not on file        Inability: Not on file      Transportation needs        Medical: Not on file        Non-medical: Not on file    Tobacco Use      Smoking status: Current Some Day Smoker        Packs/day: 1.50        Years: 15.00        Pack years: 22.5        Types: Cigarettes      Smokeless tobacco: Never Used    Substance and Sexual Activity      Alcohol use: No      Drug use: No      Sexual activity: Never        Birth control/protection: None    Lifestyle      Physical activity        Days per week: Not on file        Minutes per session: Not on file      Stress: Not on file    Relationships      Social connections        Talks on phone: Not on file        Gets together: Not on file        Attends Gnosticist service: Not on file        Active member of club or organization: Not on file        Attends meetings of clubs or organizations: Not on file        Relationship status: Not on file    Other Topics      Concerns:        Not on file    Social History Narrative      So with Cuzan disease      Current Outpatient Medications:  albuterol (VENTOLIN HFA) 90 mcg/actuation inhaler, INHALE TWO PUFFS INTO THE LUNGS EVERY SIX HOURS, Disp: 18 g, Rfl: 1  ARIPiprazole (ABILIFY) 15 MG Tab, Take 1 tablet (15 mg total) by mouth once daily., Disp: 30 tablet, Rfl: 3  citalopram (CELEXA) 20 MG tablet, Take 1 tablet (20 mg total) by mouth once daily., Disp: 30 tablet, Rfl: 3  clotrimazole-betamethasone 1-0.05% (LOTRISONE) cream, Apply topically every evening. for 14 days, Disp: 15 g, Rfl: 0  fluticasone-salmeterol diskus inhaler 250-50 mcg, INHALE ONE PUFF INTO THE LUNGS TWICE DAILY, Disp: 1 each, Rfl: 1  gabapentin (NEURONTIN) 800 MG tablet, Take 1 tablet (800 mg total) by mouth 3 (three) times daily., Disp: 90 tablet, Rfl: 5  guanFACINE (TENEX) 2 MG tablet, Take 1 tablet (2 mg total) by mouth nightly., Disp: 60 tablet,  Rfl: 3  levothyroxine (SYNTHROID) 200 MCG tablet, Take 1 tablet (200 mcg total) by mouth once daily., Disp: 90 tablet, Rfl: 3  levothyroxine (SYNTHROID) 25 MCG tablet, Take 1 tablet (25 mcg total) by mouth before breakfast., Disp: 30 tablet, Rfl: 11  lisinopriL-hydrochlorothiazide (PRINZIDE,ZESTORETIC) 10-12.5 mg per tablet, Take 1 tablet by mouth once daily., Disp: 90 tablet, Rfl: 3  meclizine (ANTIVERT) 25 mg tablet, Take 1 tablet (25 mg total) by mouth 3 (three) times daily as needed. (Patient taking differently: Take 25 mg by mouth 3 (three) times daily as needed for Dizziness. ), Disp: 90 tablet, Rfl: 0  multivit-min/iron/folic/clg446 (HAIR, SKIN AND NAILS ADVANCED ORAL), Take 1 tablet by mouth once daily., Disp: , Rfl:   ondansetron (ZOFRAN) 8 MG tablet, TAKE ONE TABLET BY MOUTH EVERY EIGHT HOURS AS NEEDED FOR NAUSEA, Disp: 30 tablet, Rfl: 3  pantoprazole (PROTONIX) 40 MG tablet, Take 1 tablet (40 mg total) by mouth once daily., Disp: 30 tablet, Rfl: 11  polyethylene glycol (GLYCOLAX) 17 gram/dose powder, USE one SCOOP in liquid one TO two TIMES daily, Disp: 510 g, Rfl: 1  rosuvastatin (CRESTOR) 40 MG Tab, Take 1 tablet (40 mg total) by mouth nightly., Disp: 30 tablet, Rfl: 3  traZODone (DESYREL) 50 MG tablet, Take 1 tablet (50 mg total) by mouth every evening., Disp: 30 tablet, Rfl: 2    No current facility-administered medications for this visit.       Review of patient's allergies indicates:  No Known Allergies    Review of Systems   Constitutional: Negative for chills and fever.   Respiratory: Positive for cough and wheezing.    Gastrointestinal: Positive for abdominal distention and constipation.   Genitourinary: Negative.    Neurological: Negative.    Psychiatric/Behavioral: The patient is nervous/anxious.        Objective:      Physical Exam  Constitutional:       General: She is not in acute distress.     Appearance: She is not toxic-appearing.   HENT:      Head: Normocephalic and atraumatic.    Cardiovascular:      Rate and Rhythm: Normal rate.   Pulmonary:      Effort: Pulmonary effort is normal.   Neurological:      General: No focal deficit present.      Mental Status: Mental status is at baseline.   Psychiatric:         Mood and Affect: Mood normal.         Thought Content: Thought content normal.         Assessment:       1. Anemia, unspecified type    2. Asthma, persistent controlled    3. Smoker    4. Constipation, unspecified constipation type        Plan:       1. Asthma, persistent controlled  Smoking cessation encouraged. Albuterol refilled, continue advair.   - albuterol (VENTOLIN HFA) 90 mcg/actuation inhaler; INHALE TWO PUFFS INTO THE LUNGS EVERY SIX HOURS  Dispense: 18 g; Refill: 1  - Ambulatory referral/consult to Smoking Cessation Program; Future    2. Anemia, unspecified type  Stool for blood x 3, fe and tibc.  - Iron and TIBC; Future    3. Smoker  Smoking cessation encouraged. Albuterol refilled, continue advair.  - albuterol (VENTOLIN HFA) 90 mcg/actuation inhaler; INHALE TWO PUFFS INTO THE LUNGS EVERY SIX HOURS  Dispense: 18 g; Refill: 1    4. Constipation, unspecified constipation type  Colonoscopy ordered.   - Ambulatory referral/consult to Gastroenterology; Future

## 2020-06-17 ENCOUNTER — TELEPHONE (OUTPATIENT)
Dept: FAMILY MEDICINE | Facility: CLINIC | Age: 50
End: 2020-06-17

## 2020-06-17 NOTE — TELEPHONE ENCOUNTER
----- Message from Beverley Carver sent at 6/17/2020 12:00 PM CDT -----  Contact: Patient  Type: Needs Medical Advice  Who Called:  Patient  Best Call Back Number: [phone]  Additional Information: Patient is reading the instructions for the stool test that she has and she says there is a solution listed that she does not have.

## 2020-06-17 NOTE — TELEPHONE ENCOUNTER
Spoke with patient, she was reading the instructions and saw that a solutions was needed. Advised her that she will not need the solution, that is what the lab uses once the sample is received to test for blood in the stool. Patient verbalized understanding.

## 2020-06-19 PROBLEM — I10 ESSENTIAL HYPERTENSION: Status: ACTIVE | Noted: 2020-06-19

## 2020-06-19 PROBLEM — R10.84 GENERALIZED ABDOMINAL PAIN: Status: ACTIVE | Noted: 2020-06-19

## 2020-06-20 PROBLEM — K63.1 COLON PERFORATION: Status: ACTIVE | Noted: 2020-06-20

## 2020-06-21 ENCOUNTER — PATIENT OUTREACH (OUTPATIENT)
Dept: ADMINISTRATIVE | Facility: OTHER | Age: 50
End: 2020-06-21

## 2020-06-21 PROBLEM — E87.6 HYPOKALEMIA: Status: ACTIVE | Noted: 2020-06-21

## 2020-06-22 ENCOUNTER — TELEPHONE (OUTPATIENT)
Dept: FAMILY MEDICINE | Facility: CLINIC | Age: 50
End: 2020-06-22

## 2020-06-22 NOTE — TELEPHONE ENCOUNTER
----- Message from Jeffrey Adam sent at 6/22/2020 10:28 AM CDT -----  Type: Needs Medical Advice  Who Called:  Boy friend- Checo Choiciarabernardino  Symptoms (please be specific):    How long has patient had these symptoms:    Pharmacy name and phone #:    Best Call Back Number: [phone]  Additional Information: Patient is currently in [facility] in critical condition. The boyfriend asking to speak with the office.

## 2020-06-22 NOTE — TELEPHONE ENCOUNTER
Spoke with patient's significant other, patient was treated for constipation with glycolax and Gi referral last week. On Thursday while at work she developed abdominal pain and went to Ed where she reported no BM x 2 weeks. She was admitted for constipation (Ct unremarkable for abnormalities except constipation).  2 days later, while still inpatient, patient developed severe pain, found to have bowel perforation with peritonitis, had emergency surgery 6/20/2020, she is in ICU on ventilator, has now developed pulmonary edema. Patient's significant other just wanted to update us on her hospital course so far.

## 2020-06-24 PROBLEM — J80 ARDS (ADULT RESPIRATORY DISTRESS SYNDROME): Status: ACTIVE | Noted: 2020-06-24

## 2020-06-26 PROBLEM — R78.81 BACTEREMIA: Status: ACTIVE | Noted: 2020-06-26

## 2020-06-26 PROBLEM — E87.6 HYPOKALEMIA: Status: RESOLVED | Noted: 2020-06-21 | Resolved: 2020-06-26

## 2020-06-26 PROBLEM — I10 ESSENTIAL HYPERTENSION: Status: RESOLVED | Noted: 2020-06-19 | Resolved: 2020-06-26

## 2020-07-04 PROBLEM — Z75.8 DISCHARGE PLANNING ISSUES: Status: ACTIVE | Noted: 2020-07-04

## 2020-07-20 ENCOUNTER — TELEPHONE (OUTPATIENT)
Dept: FAMILY MEDICINE | Facility: CLINIC | Age: 50
End: 2020-07-20

## 2020-07-20 NOTE — TELEPHONE ENCOUNTER
----- Message from Vashti Vu sent at 7/20/2020  9:25 AM CDT -----  Regarding: aPPT ACCESS  Contact: Pt  Type:  Patient Returning Call    Who Called:  pt  Does the patient know what this is regarding?:  please follow up with pt regarding hospital follow up for bowel abruption. Needs appt scheduled within 2 weeks.  Best Call Back Number:   OR cALL  Benjie  Additional Information:  Please follow up. Thank you

## 2020-07-22 ENCOUNTER — CLINICAL SUPPORT (OUTPATIENT)
Dept: SMOKING CESSATION | Facility: CLINIC | Age: 50
End: 2020-07-22
Payer: COMMERCIAL

## 2020-07-22 DIAGNOSIS — F17.200 TOBACCO USE DISORDER: Primary | ICD-10-CM

## 2020-07-22 PROCEDURE — 99404 PREV MED CNSL INDIV APPRX 60: CPT | Mod: S$GLB,,, | Performed by: GENERAL PRACTICE

## 2020-07-22 PROCEDURE — 99999 PR PBB SHADOW E&M-EST. PATIENT-LVL III: ICD-10-PCS | Mod: PBBFAC,,,

## 2020-07-22 PROCEDURE — 99404 PR PREVENT COUNSEL,INDIV,60 MIN: ICD-10-PCS | Mod: S$GLB,,, | Performed by: GENERAL PRACTICE

## 2020-07-22 PROCEDURE — 99999 PR PBB SHADOW E&M-EST. PATIENT-LVL III: CPT | Mod: PBBFAC,,,

## 2020-07-22 RX ORDER — VARENICLINE TARTRATE 0.5 (11)-1
KIT ORAL
Qty: 53 TABLET | Refills: 0 | Status: SHIPPED | OUTPATIENT
Start: 2020-07-22 | End: 2020-08-20 | Stop reason: DRUGHIGH

## 2020-07-22 RX ORDER — DIPHENHYDRAMINE HCL 25 MG
CAPSULE ORAL
Qty: 100 EACH | Refills: 0 | Status: SHIPPED | OUTPATIENT
Start: 2020-07-22 | End: 2020-08-03

## 2020-07-22 NOTE — Clinical Note
Patient will be participating in weekly tobacco cessation meetings and will begin the prescribed tobacco cessation medication regime of the Chantix starter pack and 4 mg gum. Patient has used Chantix and patches before.

## 2020-08-03 ENCOUNTER — OFFICE VISIT (OUTPATIENT)
Dept: FAMILY MEDICINE | Facility: CLINIC | Age: 50
End: 2020-08-03
Payer: MEDICARE

## 2020-08-03 VITALS
DIASTOLIC BLOOD PRESSURE: 80 MMHG | BODY MASS INDEX: 32.53 KG/M2 | HEART RATE: 81 BPM | WEIGHT: 207.25 LBS | SYSTOLIC BLOOD PRESSURE: 118 MMHG | TEMPERATURE: 99 F | OXYGEN SATURATION: 98 % | HEIGHT: 67 IN

## 2020-08-03 DIAGNOSIS — E03.4 HYPOTHYROIDISM DUE TO ACQUIRED ATROPHY OF THYROID: ICD-10-CM

## 2020-08-03 DIAGNOSIS — K63.1 COLON PERFORATION: Primary | ICD-10-CM

## 2020-08-03 DIAGNOSIS — F41.9 ANXIETY: ICD-10-CM

## 2020-08-03 DIAGNOSIS — F31.9 BIPOLAR 1 DISORDER: ICD-10-CM

## 2020-08-03 DIAGNOSIS — E78.5 HYPERLIPIDEMIA, UNSPECIFIED HYPERLIPIDEMIA TYPE: ICD-10-CM

## 2020-08-03 PROCEDURE — 99214 OFFICE O/P EST MOD 30 MIN: CPT | Mod: S$GLB,,, | Performed by: FAMILY MEDICINE

## 2020-08-03 PROCEDURE — 99999 PR PBB SHADOW E&M-EST. PATIENT-LVL V: CPT | Mod: PBBFAC,,, | Performed by: FAMILY MEDICINE

## 2020-08-03 PROCEDURE — 3008F PR BODY MASS INDEX (BMI) DOCUMENTED: ICD-10-PCS | Mod: CPTII,S$GLB,, | Performed by: FAMILY MEDICINE

## 2020-08-03 PROCEDURE — 99214 PR OFFICE/OUTPT VISIT, EST, LEVL IV, 30-39 MIN: ICD-10-PCS | Mod: S$GLB,,, | Performed by: FAMILY MEDICINE

## 2020-08-03 PROCEDURE — 99499 RISK ADDL DX/OHS AUDIT: ICD-10-PCS | Mod: S$GLB,,, | Performed by: FAMILY MEDICINE

## 2020-08-03 PROCEDURE — 3074F SYST BP LT 130 MM HG: CPT | Mod: CPTII,S$GLB,, | Performed by: FAMILY MEDICINE

## 2020-08-03 PROCEDURE — 3079F DIAST BP 80-89 MM HG: CPT | Mod: CPTII,S$GLB,, | Performed by: FAMILY MEDICINE

## 2020-08-03 PROCEDURE — 99999 PR PBB SHADOW E&M-EST. PATIENT-LVL V: ICD-10-PCS | Mod: PBBFAC,,, | Performed by: FAMILY MEDICINE

## 2020-08-03 PROCEDURE — 3079F PR MOST RECENT DIASTOLIC BLOOD PRESSURE 80-89 MM HG: ICD-10-PCS | Mod: CPTII,S$GLB,, | Performed by: FAMILY MEDICINE

## 2020-08-03 PROCEDURE — 3008F BODY MASS INDEX DOCD: CPT | Mod: CPTII,S$GLB,, | Performed by: FAMILY MEDICINE

## 2020-08-03 PROCEDURE — 3074F PR MOST RECENT SYSTOLIC BLOOD PRESSURE < 130 MM HG: ICD-10-PCS | Mod: CPTII,S$GLB,, | Performed by: FAMILY MEDICINE

## 2020-08-03 PROCEDURE — 99499 UNLISTED E&M SERVICE: CPT | Mod: S$GLB,,, | Performed by: FAMILY MEDICINE

## 2020-08-03 RX ORDER — LEVOTHYROXINE SODIUM 25 UG/1
TABLET ORAL
COMMUNITY
Start: 2020-07-17 | End: 2021-05-25

## 2020-08-03 RX ORDER — OFLOXACIN 3 MG/ML
SOLUTION/ DROPS OPHTHALMIC
COMMUNITY
Start: 2020-07-29 | End: 2021-05-25

## 2020-08-03 RX ORDER — PREDNISOLONE ACETATE 10 MG/ML
SUSPENSION/ DROPS OPHTHALMIC
COMMUNITY
Start: 2020-07-29 | End: 2021-05-25

## 2020-08-03 RX ORDER — ACETAMINOPHEN 325 MG/1
650 TABLET ORAL EVERY 6 HOURS PRN
COMMUNITY
Start: 2020-07-17

## 2020-08-03 NOTE — PROGRESS NOTES
Subjective:       Patient ID: Noreen Wheatley is a 49 y.o. female. Here to f/u on recent hospitalization    Admission Date: 6/18/2020  Hospital Length of Stay: 18 days  Discharge Date and Time:  07/06/2020 12:47 PM  Attending Physician: Messi Ireland MD   Discharging Provider: Messi Ireland MD  Primary Care Provider: Abel Haile MD  HPI:   The patient is a 49-year-old woman with a history of COPD, ongoing tobacco use, hypertension, hypothyroidism, chronic constipation who presented to the [facility] ED with a complaint of abdominal pain and distension.  She states her last good bowel movement was 2 weeks ago and she started taking MiraLax a couple of days ago resulting in only 1 small bowel movement earlier today.  She has had nausea, vomiting x1 day as well.  In the ED, she underwent a CT scan of the abdomen and pelvis which revealed a copious amount of stool.  No acute findings were otherwise noted.  IV access was not obtained.  Because of the degree of pain she was in, Hospital Medicine was contacted for observation.  She did receive intramuscular injection of Dilaudid 2 mg prior to transfer.  On arrival to the hospital, floor nurse was able to obtain IV access with no difficulty.  She is still complaining of pain.     Procedure(s) (LRB):  CLOSURE, WOUND; removal of Abthera dressing and closure of surgical wound, ILEOCOTIC ANASTOMSIS (N/A)       Hospital Course:   Patient was admitted to the hospital medicine service with complaints of abdominal pain. Patient ahd findings on CT-abd/pelvis of constipation. Multiple enemas were given along with Golytely. GI was consulted who recommended continuing with Golytely and suppository along with enemas. Patient ended up spiking a fever and was started on IV antibiotics. Blood cultures were obtained. Patient was noted to have free air underneath the abdomen. General surgery was consulted. Patient status post ex-lap with right hemicolectomy. Patient was left  intubated and moved to the ICU. She developed worsening bilateral infiltrates on CXR concerning for ARDS. Wound vac was placed in anticipation for return to the OR. She was noted to have Bacteroides growing in blood cultures. Repeat cultures are noted to be negative so far. Patient was taken back to the OR for washout of abdomen on 6/23. Patient went back to the OR for closure and reanastomosis.  Patient continued to clinically improved and ventilator was able to be weaned off.  Patient successfully extubated.  Infectious Disease consulted given patient's continued fever spikes.  Antibiotics escalated and indwelling lines removed.  Leukocytosis improved nicely thereafter and patient tolerating diet.  Transferred orders were placed on 07/01/2020. Patient's diet was advanced which she was tolerating. PT/OT were consulted and recommended inpatient rehab. Patient has a midline wound vac in place which will stay on per discussion with general surgery. Social workers were consulted for placement. Patient had a repeat CT-abd/pelvis which showed improvement and no evidence of abscess. Case was discussed with ID who recommended transitioning to oral antibiotics. Last day will be on 7/13 per their recs. Patient was eventually discharged home in stable condition with outpatient follow up.      She has f/u with general sugery on 8/19  Getting home zhang with wound care twice a week and PT  No fever.  Getting BMs every other day; appetite normal  Taking Percocet BID PRN  Getting some increased anxiety related to this illness. Some occasional panic attacks.    Hypothyroidism - she is tolerating Synthroid 225 mcg daily  HLD - she is taking Crestor 40mg daily  Bipolar, anxiety - taking celexa 20mg, abilify 15mg  RLS - taking gabapentin 800mg TID  Asthma - using ventolin PRN    Planning for cataract surgery tomorrow.  She does admit to some inferior eyelid swelling which started this morning after she began her eye drops  preoperatively.      Past Medical History:   Diagnosis Date    Abnormal Pap smear     19 years old//per patient normal since    Anemia     Anxiety     Asthma, persistent controlled     Bipolar 1 disorder     Cervical dysplasia     Chronic constipation     COPD (chronic obstructive pulmonary disease)     Depression     GERD (gastroesophageal reflux disease)     HLD (hyperlipidemia)     Hypertension     Hypothyroidism     Panic attack        Past Surgical History:   Procedure Laterality Date    APPLICATION OF WOUND VACUUM-ASSISTED CLOSURE DEVICE N/A 2020    Procedure: APPLICATION, WOUND VAC; ABTHERA;  Surgeon: Carolyn Howell MD;  Location: [facility];  Service: General;  Laterality: N/A;     SECTION      CLOSURE OF WOUND N/A 2020    Procedure: CLOSURE, WOUND; removal of Abthera dressing and closure of surgical wound, ILEOCOTIC ANASTOMSIS;  Surgeon: Carolyn Howell MD;  Location: [facility] OR;  Service: General;  Laterality: N/A;    HEMICOLECTOMY Right 2020    Procedure: HEMICOLECTOMY; RIGHT, EXTENSIVE;  Surgeon: Carolyn Howell MD;  Location: [facility];  Service: General;  Laterality: Right;    leep      melanoma excision      right nostril    tubal ligation      TUBAL LIGATION         Review of patient's allergies indicates:  No Known Allergies    Social History     Socioeconomic History    Marital status:      Spouse name: Not on file    Number of children: 2    Years of education: Not on file    Highest education level: Not on file   Occupational History    Occupation: disability     Employer: [employer]   Social Needs    Financial resource strain: Not on file    Food insecurity     Worry: Not on file     Inability: Not on file    Transportation needs     Medical: Not on file     Non-medical: Not on file   Tobacco Use    Smoking status: Current Some Day Smoker     Packs/day: 1.00     Years: 31.00     Pack years: 31.00     Types: Cigarettes     Smokeless tobacco: Never Used   Substance and Sexual Activity    Alcohol use: No    Drug use: No    Sexual activity: Never     Birth control/protection: None   Lifestyle    Physical activity     Days per week: Not on file     Minutes per session: Not on file    Stress: Not on file   Relationships    Social connections     Talks on phone: Not on file     Gets together: Not on file     Attends Yazidism service: Not on file     Active member of club or organization: Not on file     Attends meetings of clubs or organizations: Not on file     Relationship status: Not on file   Other Topics Concern    Not on file   Social History Narrative    So with Cuzan disease       Current Outpatient Medications on File Prior to Visit   Medication Sig Dispense Refill    acetaminophen (TYLENOL) 325 MG tablet Take 650 mg by mouth every 6 (six) hours as needed.      albuterol (VENTOLIN HFA) 90 mcg/actuation inhaler INHALE TWO PUFFS INTO THE LUNGS EVERY SIX HOURS 18 g 1    ALPRAZolam (XANAX) 0.5 MG tablet Take 1 tablet (0.5 mg total) by mouth 2 (two) times daily as needed for Anxiety. 20 tablet 0    ARIPiprazole (ABILIFY) 15 MG Tab Take 1 tablet (15 mg total) by mouth once daily. 30 tablet 3    citalopram (CELEXA) 20 MG tablet Take 1 tablet (20 mg total) by mouth once daily. 30 tablet 3    fluticasone-salmeterol diskus inhaler 250-50 mcg INHALE ONE PUFF INTO THE LUNGS TWICE DAILY 1 each 1    gabapentin (NEURONTIN) 800 MG tablet Take 1 tablet (800 mg total) by mouth 3 (three) times daily. 90 tablet 5    levothyroxine (SYNTHROID) 200 MCG tablet Take 1 tablet (200 mcg total) by mouth once daily. 90 tablet 3    levothyroxine (SYNTHROID) 25 MCG tablet TAKE ONE TABLET (25MCG) BY MOUTH BEFORE BREAKFAST      multivit-min/iron/folic/wdu132 (HAIR, SKIN AND NAILS ADVANCED ORAL) Take 1 tablet by mouth once daily.      ofloxacin (OCUFLOX) 0.3 % ophthalmic solution Instill 1 drop into right eye four times a day       oxyCODONE-acetaminophen (PERCOCET) 5-325 mg per tablet Take 1 tablet by mouth every 8 (eight) hours as needed for Pain. 21 tablet 0    polyethylene glycol (GLYCOLAX) 17 gram/dose powder USE one SCOOP in liquid one TO two TIMES daily 510 g 1    prednisoLONE acetate (PRED FORTE) 1 % DrpS Instill 1 drop into right eye four times a day      rosuvastatin (CRESTOR) 40 MG Tab Take 1 tablet (40 mg total) by mouth nightly. 30 tablet 3    traZODone (DESYREL) 50 MG tablet Take 1 tablet (50 mg total) by mouth every evening. 30 tablet 2    varenicline (CHANTIX STARTING MONTH BOX) 0.5 mg (11)- 1 mg (42) tablet Take one 0.5mg tab by mouth once daily X3 days,then increase to one 0.5mg tab twice daily X4 days,then increase to one 1mg tab twice daily 53 tablet 0     No current facility-administered medications on file prior to visit.        Family History   Problem Relation Age of Onset    Thyroid disease Mother     Breast cancer Mother 50    Diabetes Father     Hyperlipidemia Father     Diabetes Sister     Cancer Sister         liver    Liver cancer Sister     Thyroid disease Sister        Review of Systems   Constitutional: Negative for fever, chills, activity change, appetite change, fatigue.   HENT: Negative for ear pain, nosebleeds, congestion, sore throat, rhinorrhea, trouble swallowing, neck pain and ear discharge.    Eyes: Negative for pain, discharge and visual disturbance.   Respiratory: Negative for choking, chest tightness, shortness of breath.   Cardiovascular: Negative for chest pain, palpitations and leg swelling.   Gastrointestinal: Negative for nausea, vomiting, abdominal pain, diarrhea, constipation, blood in stool.  Genitourinary: Negative for dysuria, urgency, frequency, hematuria, vaginal bleeding, vaginal discharge, difficulty urinating, vaginal pain, menstrual problem and pelvic pain.   Skin: Negative for color change, rash and wound.   Neurological: Negative for dizziness, tremors, seizures,  syncope, weakness, light-headedness, numbness and headaches.   Hematological: Negative for adenopathy. Does not bruise/bleed easily.   Psychiatric/Behavioral: Negative for suicidal ideas, hallucinations, behavioral problems, confusion, disturbed wake/sleep cycle, dysphoric mood, decreased concentration and agitation.       Objective:        Physical Exam   Constitutional: She is oriented to person, place, and time. She appears well-developed and well-nourished. She is active.    Mouth/Throat: Oropharynx is clear and moist.   Eyes: Conjunctivae, EOM and lids are normal.    Pulmonary/Chest: Effort normal  Neurological: She is alert and oriented to person, place, and time. She has normal strength. No cranial nerve deficit. Coordination normal.   Psychiatric: She has a normal mood and affect.   Abd: dressing in place    Hospital records reviewed        Assessment:       1. Colon perforation    2. Bipolar 1 disorder    3. Anxiety    4. Hypothyroidism due to acquired atrophy of thyroid    5. Hyperlipidemia, unspecified hyperlipidemia type        Plan:       Colon perforation  -     CBC auto differential; Future; Expected date: 11/01/2020    Bipolar 1 disorder  -     Ambulatory referral/consult to Psychiatry; Future; Expected date: 08/10/2020    Anxiety  -     Ambulatory referral/consult to Psychiatry; Future; Expected date: 08/10/2020    Hypothyroidism due to acquired atrophy of thyroid  -     TSH; Future; Expected date: 11/01/2020    Hyperlipidemia, unspecified hyperlipidemia type  -     Comprehensive metabolic panel; Future; Expected date: 11/01/2020  -     Lipid Panel; Future; Expected date: 11/01/2020    Other orders  -     L.acidophil,parac-S.therm-Bif. (RISAQUAD) Cap capsule; Take 1 capsule by mouth once daily.  Dispense: 30 capsule; Refill: 0    continue HH wound care and PT  F/u with general surgery as planned  Advised her to start weaning off the Percocet and to use Tylenol PRN  Comments: continue Synthroid 225  mcg daily   continue Crestor 40mg   Continue low fat diet  Bipolar/depression - followup with psychiatrist; referral entered  F/u with ophthalmology regarding cataract surgery  F/u 3 months with labs

## 2020-08-05 ENCOUNTER — CLINICAL SUPPORT (OUTPATIENT)
Dept: SMOKING CESSATION | Facility: CLINIC | Age: 50
End: 2020-08-05
Payer: COMMERCIAL

## 2020-08-05 DIAGNOSIS — F17.200 TOBACCO USE DISORDER: Primary | ICD-10-CM

## 2020-08-05 PROCEDURE — 99404 PREV MED CNSL INDIV APPRX 60: CPT | Mod: S$GLB,,, | Performed by: GENERAL PRACTICE

## 2020-08-05 PROCEDURE — 99404 PR PREVENT COUNSEL,INDIV,60 MIN: ICD-10-PCS | Mod: S$GLB,,, | Performed by: GENERAL PRACTICE

## 2020-08-05 PROCEDURE — 99999 PR PBB SHADOW E&M-EST. PATIENT-LVL III: ICD-10-PCS | Mod: PBBFAC,,,

## 2020-08-05 PROCEDURE — 99999 PR PBB SHADOW E&M-EST. PATIENT-LVL III: CPT | Mod: PBBFAC,,,

## 2020-08-05 RX ORDER — IBUPROFEN 200 MG
1 TABLET ORAL DAILY
Qty: 14 PATCH | Refills: 0 | Status: SHIPPED | OUTPATIENT
Start: 2020-08-05 | End: 2020-08-20 | Stop reason: SDUPTHER

## 2020-08-05 NOTE — Clinical Note
Patient is currently smoking 1 ppd and taking Chantix with no negative side effects at this time. I will order the 21 mg patch to help with her cravings. Patient not using gum at this time but will begin using to help curb her cravings. We discussed session 1 material at this time. Patient has follow up in clinic in 1 week.

## 2020-08-05 NOTE — PROGRESS NOTES
Individual Follow-Up Form    8/5/2020    Quit Date:     Clinical Status of Patient: Outpatient    Length of Service: 60 minutes    Continuing Medication: yes  Chantix    Other Medications: gum     Target Symptoms: Withdrawal and medication side effects. The following were  rated moderate (3) to severe (4) on TCRS:  · Moderate (3): desire or crave;discussed with patient  · Severe (4): none    Comments: Patient is currently smoking 1 ppd and taking Chantix with no negative side effects at this time. I will order the 21 mg patch to help with her cravings. Patient not using gum at this time but will begin using to help curb her cravings. We discussed session 1 material at this time. Patient has follow up in clinic in 1 week.    Diagnosis: F17.200    Next Visit: 2 weeks

## 2020-08-06 ENCOUNTER — TELEPHONE (OUTPATIENT)
Dept: GASTROENTEROLOGY | Facility: CLINIC | Age: 50
End: 2020-08-06

## 2020-08-06 NOTE — TELEPHONE ENCOUNTER
Pt states that she just underwent major intestinal surgery and was informed that she should not have any scopes at this time. Phone number was provided for call back when she is able to have cscope. Order canceled at this time.

## 2020-08-19 ENCOUNTER — DOCUMENT SCAN (OUTPATIENT)
Dept: HOME HEALTH SERVICES | Facility: HOSPITAL | Age: 50
End: 2020-08-19
Payer: MEDICARE

## 2020-08-20 ENCOUNTER — CLINICAL SUPPORT (OUTPATIENT)
Dept: SMOKING CESSATION | Facility: CLINIC | Age: 50
End: 2020-08-20
Payer: COMMERCIAL

## 2020-08-20 DIAGNOSIS — F17.200 TOBACCO USE DISORDER: Primary | ICD-10-CM

## 2020-08-20 PROCEDURE — 99407 PR TOBACCO USE CESSATION INTENSIVE >10 MINUTES: ICD-10-PCS | Mod: S$GLB,,, | Performed by: GENERAL PRACTICE

## 2020-08-20 PROCEDURE — 99999 PR PBB SHADOW E&M-EST. PATIENT-LVL III: ICD-10-PCS | Mod: PBBFAC,,,

## 2020-08-20 PROCEDURE — 99407 BEHAV CHNG SMOKING > 10 MIN: CPT | Mod: S$GLB,,, | Performed by: GENERAL PRACTICE

## 2020-08-20 PROCEDURE — 99999 PR PBB SHADOW E&M-EST. PATIENT-LVL III: CPT | Mod: PBBFAC,,,

## 2020-08-20 RX ORDER — IBUPROFEN 200 MG
1 TABLET ORAL DAILY
Qty: 14 PATCH | Refills: 0 | Status: SHIPPED | OUTPATIENT
Start: 2020-08-20 | End: 2021-05-25

## 2020-08-20 RX ORDER — VARENICLINE TARTRATE 1 MG/1
1 TABLET, FILM COATED ORAL 2 TIMES DAILY
Qty: 60 TABLET | Refills: 0 | Status: SHIPPED | OUTPATIENT
Start: 2020-08-20 | End: 2020-09-21 | Stop reason: SDUPTHER

## 2020-09-02 ENCOUNTER — CLINICAL SUPPORT (OUTPATIENT)
Dept: SMOKING CESSATION | Facility: CLINIC | Age: 50
End: 2020-09-02
Payer: COMMERCIAL

## 2020-09-02 DIAGNOSIS — F17.200 TOBACCO USE DISORDER: Primary | ICD-10-CM

## 2020-09-02 PROCEDURE — 99999 PR PBB SHADOW E&M-EST. PATIENT-LVL III: CPT | Mod: PBBFAC,,,

## 2020-09-02 PROCEDURE — 99999 PR PBB SHADOW E&M-EST. PATIENT-LVL III: ICD-10-PCS | Mod: PBBFAC,,,

## 2020-09-02 PROCEDURE — 99404 PREV MED CNSL INDIV APPRX 60: CPT | Mod: S$GLB,,, | Performed by: GENERAL PRACTICE

## 2020-09-02 PROCEDURE — 99404 PR PREVENT COUNSEL,INDIV,60 MIN: ICD-10-PCS | Mod: S$GLB,,, | Performed by: GENERAL PRACTICE

## 2020-09-02 NOTE — Clinical Note
Patient is currently smoking 12 cpd and taking Chantix and gum with no negative side effects at this time. We discussed new habits, reduction, nicotine dependence and strategies to achieve her quit. Patient has follow up in clinic in 2 weeks.

## 2020-09-02 NOTE — PROGRESS NOTES
Individual Follow-Up Form    9/2/2020    Quit Date:     Clinical Status of Patient: Outpatient    Length of Service: 60 minutes    Continuing Medication: yes  Chantix    Other Medications: gum     Target Symptoms: Withdrawal and medication side effects. The following were  rated moderate (3) to severe (4) on TCRS:  · Moderate (3): none  · Severe (4): none    Comments: Patient is currently smoking 12 cpd and taking Chantix and gum with no negative side effects at this time. We discussed new habits, reduction, nicotine dependence and strategies to achieve her quit. Patient has follow up in clinic in 2 weeks.    Diagnosis: F17.200    Next Visit: 2 weeks

## 2020-09-21 ENCOUNTER — CLINICAL SUPPORT (OUTPATIENT)
Dept: SMOKING CESSATION | Facility: CLINIC | Age: 50
End: 2020-09-21
Payer: COMMERCIAL

## 2020-09-21 DIAGNOSIS — F17.200 TOBACCO USE DISORDER: Primary | ICD-10-CM

## 2020-09-21 PROCEDURE — 99999 PR PBB SHADOW E&M-EST. PATIENT-LVL III: CPT | Mod: PBBFAC,,,

## 2020-09-21 PROCEDURE — 99404 PR PREVENT COUNSEL,INDIV,60 MIN: ICD-10-PCS | Mod: S$GLB,,, | Performed by: GENERAL PRACTICE

## 2020-09-21 PROCEDURE — 99999 PR PBB SHADOW E&M-EST. PATIENT-LVL III: ICD-10-PCS | Mod: PBBFAC,,,

## 2020-09-21 PROCEDURE — 99404 PREV MED CNSL INDIV APPRX 60: CPT | Mod: S$GLB,,, | Performed by: GENERAL PRACTICE

## 2020-09-21 RX ORDER — VARENICLINE TARTRATE 1 MG/1
1 TABLET, FILM COATED ORAL 2 TIMES DAILY
Qty: 60 TABLET | Refills: 0 | Status: SHIPPED | OUTPATIENT
Start: 2020-09-21 | End: 2020-10-22

## 2020-09-21 NOTE — Clinical Note
Patient is currently smoking 10 cpd and taking Chantix with no negative side effects at this time. Patient not using patches consistently so we discussed taking as directed. Patient states she finds the patches very helpful and will take as directed. We discussed medications, dosages, and strategies to achieve her quit. Patient has a follow up in 2 weeks and needs refill of Chantix.

## 2020-09-21 NOTE — PROGRESS NOTES
Individual Follow-Up Form    9/21/2020    Quit Date:     Clinical Status of Patient: Outpatient    Length of Service: 60 minutes    Continuing Medication: yes  Chantix    Other Medications: patches     Target Symptoms: Withdrawal and medication side effects. The following were  rated moderate (3) to severe (4) on TCRS:  · Moderate (3): none  · Severe (4): none    Comments: Patient is currently smoking 10 cpd and taking Chantix with no negative side effects at this time. Patient not using patches consistently so we discussed taking as directed. Patient states she finds the patches very helpful and will take as directed. We discussed medications, dosages, and strategies to achieve her quit. Patient has a follow up in 2 weeks and needs refill of Chantix.    Diagnosis: F17.200    Next Visit: 2 weeks

## 2020-09-22 DIAGNOSIS — F41.9 ANXIETY: ICD-10-CM

## 2020-09-22 NOTE — TELEPHONE ENCOUNTER
No new care gaps identified.  Powered by [employer]. Reference number: [id number]. 9/22/2020 8:43:11 AM FELIPET

## 2020-09-23 RX ORDER — CITALOPRAM 20 MG/1
TABLET, FILM COATED ORAL
Qty: 90 TABLET | Refills: 3 | Status: SHIPPED | OUTPATIENT
Start: 2020-09-23

## 2020-09-23 RX ORDER — ARIPIPRAZOLE 15 MG/1
15 TABLET ORAL DAILY
Qty: 30 TABLET | Refills: 3 | Status: SHIPPED | OUTPATIENT
Start: 2020-09-23

## 2020-09-23 NOTE — PROGRESS NOTES
Refill Routing Note   Medication(s) are not appropriate for processing by [facility]:       - Drug-Disease Interaction (HYPOKALEMIA)     Medication-related problems identified:   Non-adherence (knowledge deficit) non-intentional  Drug-disease interaction  Medication Therapy Plan: CDMR; DDzi(HYPOKALEMIA); PER EPIC DATA 0% ADHERENCE; PER MEDMINDED 57% (11/1/19-5/6/20) POTENTIALLY NON-COMPLIANT LAST FILL 8/26/20 CELEXA 40 MG FOR 30 DAYS, DEFER TO YOU  Medication reconciliation completed: Yes      Automatic Epic Generated Protocol Data:        Requested Prescriptions   Pending Prescriptions Disp Refills    citalopram (CELEXA) 20 MG tablet [Pharmacy Med Name: citalopram 20 mg tablet] 90 tablet 0     Sig: TAKE ONE TABLET (20MG) BY MOUTH ONCE DAILY       Psychiatry:  Antidepressants - SSRI Passed - 9/22/2020  8:42 AM        Passed - Patient is at least 18 years old        Passed - Negative Pregnancy Status Check        Passed - Office visit in past 6 months or future 90 days.     Recent Outpatient Visits            3 weeks ago Colon perforation    [facility] Carolyn Howell MD    1 month ago Open wound of abdominal wall, subsequent encounter    [facility] BI Cornejo    1 month ago Colon perforation    [facility] Abel Haile MD    2 months ago Bipolar 1 disorder    [facility] Carolyn Howell MD    3 months ago Anemia, unspecified type    [facility] Myesha Kingston, ELI          Future Appointments              In 1 week Carolyn Howell MD [facility], [facility] S[facility]    In 1 month LAB, [place] [facility] [place]    In 1 month Abel Haile MD [facility]                      Appointments  past 12m or future 3m with PCP    Date Provider   Last Visit   8/3/2020 Abel Haile MD   Next Visit   11/3/2020 Abel Haile MD    ED visits in past 90 days: 0     Note composed:12:16 PM 09/23/2020

## 2020-09-24 NOTE — PROGRESS NOTES
Refill Authorization Note   Noreen Wheatley is requesting a refill authorization.     Brief assessment and rationale for refill: DEFER: DDzi/NA NON-INTENTIONAL     Medication-related problems identified:   Non-adherence (knowledge deficit) non-intentional  Drug-disease interaction    Medication Therapy Plan: CDMR; DDzi(HYPOKALEMIA); PER EPIC DATA 0% ADHERENCE; PER MEDMINDED 57% (11/1/19-5/6/20) POTENTIALLY NON-COMPLIANT LAST FILL 8/26/20 CELEXA 40 MG FOR 30 DAYS, DEFER TO YOU    Medication reconciliation completed: Yes                    Comments:      Orders Placed This Encounter    citalopram (CELEXA) 20 MG tablet      Requested Prescriptions   Pending Prescriptions Disp Refills    ARIPiprazole (ABILIFY) 15 MG Tab 30 tablet 3     Sig: Take 1 tablet (15 mg total) by mouth once daily.       There is no refill protocol information for this order      Signed Prescriptions Disp Refills    citalopram (CELEXA) 20 MG tablet 90 tablet 3     Sig: TAKE ONE TABLET (20MG) BY MOUTH ONCE DAILY       Psychiatry:  Antidepressants - SSRI Passed - 9/23/2020 12:31 PM        Passed - Patient is at least 18 years old        Passed - Negative Pregnancy Status Check        Passed - Office Visit within last 12 months or future 90 days.     Recent Outpatient Visits            3 weeks ago Colon perforation    [facility] Carolyn Howell MD    1 month ago Open wound of abdominal wall, subsequent encounter    [facility] BI Cornejo    1 month ago Colon perforation    [facility] Abel Haile MD    2 months ago Bipolar 1 disorder    [facility] Carolyn Howell MD    3 months ago Anemia, unspecified type    [facility] Myesha Kingston NP          Future Appointments              In 1 week Carolyn Howell MD [facility], [facility] Surg    In 1 month LAB, [place] [facility]    In 1 month  Abel Haile MD [facility]                    Appointments  past 12m or future 3m with PCP    Date Provider   Last Visit   8/3/2020 Abel Haile MD   Next Visit   11/3/2020 Abel Haile MD   ED visits in past 90 days: 0     Note composed:8:37 PM 09/23/2020

## 2020-10-06 ENCOUNTER — TELEPHONE (OUTPATIENT)
Dept: SMOKING CESSATION | Facility: CLINIC | Age: 50
End: 2020-10-06

## 2020-10-08 ENCOUNTER — TELEPHONE (OUTPATIENT)
Dept: SMOKING CESSATION | Facility: CLINIC | Age: 50
End: 2020-10-08

## 2020-10-21 ENCOUNTER — DOCUMENT SCAN (OUTPATIENT)
Dept: HOME HEALTH SERVICES | Facility: HOSPITAL | Age: 50
End: 2020-10-21

## 2020-10-27 ENCOUNTER — TELEPHONE (OUTPATIENT)
Dept: SMOKING CESSATION | Facility: CLINIC | Age: 50
End: 2020-10-27

## 2020-11-11 ENCOUNTER — TELEPHONE (OUTPATIENT)
Dept: FAMILY MEDICINE | Facility: CLINIC | Age: 50
End: 2020-11-11

## 2020-11-11 DIAGNOSIS — Z20.5 EXPOSURE TO HEPATITIS C: Primary | ICD-10-CM

## 2020-11-11 NOTE — TELEPHONE ENCOUNTER
----- Message from Christal  sent at 11/11/2020  4:10 PM CST -----  Regarding: orders  Contact: pt  Type: Needs Medical Advice    Who Called:      Best Call Back Number:     Additional Information: Requesting a call back from Nurse, regarding pt's boyfriend has Hep c and pt needs to be tested for it ASAP ,please call back with advice

## 2020-11-11 NOTE — TELEPHONE ENCOUNTER
Spoke with patient. Her boyfriend was diagnosed with hepatitis C she needs to be tested. Orders pended

## 2020-11-13 ENCOUNTER — LAB VISIT (OUTPATIENT)
Dept: LAB | Facility: HOSPITAL | Age: 50
End: 2020-11-13
Attending: FAMILY MEDICINE
Payer: MEDICARE

## 2020-11-13 DIAGNOSIS — Z20.5 EXPOSURE TO HEPATITIS C: ICD-10-CM

## 2020-11-13 PROCEDURE — 36415 COLL VENOUS BLD VENIPUNCTURE: CPT | Mod: PO

## 2020-11-13 PROCEDURE — 86803 HEPATITIS C AB TEST: CPT

## 2020-11-16 LAB — HCV AB SERPL QL IA: NEGATIVE

## 2020-12-02 ENCOUNTER — DOCUMENT SCAN (OUTPATIENT)
Dept: HOME HEALTH SERVICES | Facility: HOSPITAL | Age: 50
End: 2020-12-02

## 2020-12-04 ENCOUNTER — DOCUMENT SCAN (OUTPATIENT)
Dept: HOME HEALTH SERVICES | Facility: HOSPITAL | Age: 50
End: 2020-12-04

## 2020-12-08 ENCOUNTER — EXTERNAL HOME HEALTH (OUTPATIENT)
Dept: HOME HEALTH SERVICES | Facility: HOSPITAL | Age: 50
End: 2020-12-08

## 2020-12-30 ENCOUNTER — DOCUMENT SCAN (OUTPATIENT)
Dept: HOME HEALTH SERVICES | Facility: HOSPITAL | Age: 50
End: 2020-12-30

## 2021-01-11 ENCOUNTER — DOCUMENT SCAN (OUTPATIENT)
Dept: HOME HEALTH SERVICES | Facility: HOSPITAL | Age: 51
End: 2021-01-11

## 2021-02-15 DIAGNOSIS — G25.81 RLS (RESTLESS LEGS SYNDROME): ICD-10-CM

## 2021-02-17 RX ORDER — GABAPENTIN 800 MG/1
800 TABLET ORAL 3 TIMES DAILY
Qty: 90 TABLET | Refills: 5 | Status: SHIPPED | OUTPATIENT
Start: 2021-02-17 | End: 2021-05-25

## 2021-05-06 ENCOUNTER — PATIENT MESSAGE (OUTPATIENT)
Dept: RESEARCH | Facility: HOSPITAL | Age: 51
End: 2021-05-06

## 2021-05-12 ENCOUNTER — CLINICAL SUPPORT (OUTPATIENT)
Dept: SMOKING CESSATION | Facility: CLINIC | Age: 51
End: 2021-05-12
Payer: COMMERCIAL

## 2021-05-12 DIAGNOSIS — F17.200 NICOTINE DEPENDENCE: Primary | ICD-10-CM

## 2021-05-12 PROCEDURE — 99407 PR TOBACCO USE CESSATION INTENSIVE >10 MINUTES: ICD-10-PCS | Mod: S$GLB,,,

## 2021-05-12 PROCEDURE — 99407 BEHAV CHNG SMOKING > 10 MIN: CPT | Mod: S$GLB,,,

## 2021-05-15 DIAGNOSIS — E03.4 HYPOTHYROIDISM DUE TO ACQUIRED ATROPHY OF THYROID: Primary | ICD-10-CM

## 2021-05-18 RX ORDER — LEVOTHYROXINE SODIUM 200 UG/1
200 TABLET ORAL DAILY
Qty: 90 TABLET | Refills: 0 | Status: SHIPPED | OUTPATIENT
Start: 2021-05-18 | End: 2021-06-18

## 2021-06-01 ENCOUNTER — TELEPHONE (OUTPATIENT)
Dept: SMOKING CESSATION | Facility: CLINIC | Age: 51
End: 2021-06-01

## 2021-08-03 DIAGNOSIS — G25.81 RLS (RESTLESS LEGS SYNDROME): ICD-10-CM

## 2021-08-04 RX ORDER — GABAPENTIN 800 MG/1
TABLET ORAL
Qty: 90 TABLET | Refills: 5 | Status: SHIPPED | OUTPATIENT
Start: 2021-08-04 | End: 2022-01-20

## 2021-11-01 ENCOUNTER — PES CALL (OUTPATIENT)
Dept: ADMINISTRATIVE | Facility: CLINIC | Age: 51
End: 2021-11-01
Payer: MEDICARE

## 2022-02-22 ENCOUNTER — PATIENT MESSAGE (OUTPATIENT)
Dept: ADMINISTRATIVE | Facility: CLINIC | Age: 52
End: 2022-02-22
Payer: MEDICARE

## 2022-02-22 ENCOUNTER — PATIENT OUTREACH (OUTPATIENT)
Dept: ADMINISTRATIVE | Facility: CLINIC | Age: 52
End: 2022-02-22
Payer: MEDICARE

## 2022-02-22 ENCOUNTER — EXTERNAL HOSPITAL ADMISSION (OUTPATIENT)
Dept: ADMINISTRATIVE | Facility: CLINIC | Age: 52
End: 2022-02-22
Payer: MEDICARE

## 2022-02-22 NOTE — PROGRESS NOTES
C3 nurse attempted to contact Noreen Wheatley for a TCC post hospital discharge follow up call. No answer. Left voicemail with callback information. The patient does not have a scheduled HOSFU appointment. Message sent to PCP staff for assistance with scheduling visit with patient. Message sent via [facility] portal for Post Discharge Attempt.

## 2022-02-22 NOTE — TELEPHONE ENCOUNTER
Spoke with pt to schedule hospital f/u appt, pt states she doesn tffel like making appt right ,states she is not feeling up to it, pt states she will call back

## 2022-02-23 NOTE — PROGRESS NOTES
C3 nurse spoke with Noreen Wheatley for a TCC post hospital discharge follow up call. The patient reports does not have a scheduled HOSFU appointment with new PCP within 7-14 days. C3 nurse was unable to schedule HOSFU appointment for [facility] P[facility]. Patient advised to contact their PCP to schedule a HOSPFU within 7-14 days. Patient states that she will call for appointment, now.  Patient declined referral for NP @ HOME.    Medications:  albuterol HFA 90 mcg/actuation inhaler   every 4 (four) hours as needed.   Allergy Relief, cetirizine, 10 mg tablet   Take 10 mg by mouth once daily.   ALPRAZolam (XANAX) 0.5 mg tablet   3 (three) times daily as needed.   budesonide-glycopyr-formoterol (Breztri Aerosphere) 160-9-4.8 mcg/actuation HFA Aerosol Inhaler   Inhale into the lungs 2 (two) times daily.   clonazePAM (KlonoPIN) 1 mg tablet   Take 1 mg by mouth 2 (two) times daily. PRN   dextroamphetamine-amphetamine 20 mg Tablet   Take 1 tablet by mouth 3 (three) times daily.   fluticasone propion-salmeteroL (ADVAIR DISKUS) 250-50 mcg/dose diskus inhaler   Inhale 1 puff into the lungs 1-2 times per day.   HYDROcodone-acetaminophen (NORCO)  mg per tablet   Take 1 tablet by mouth 3 (three) times daily as needed. for pain   levothyroxine (SYNTHROID, LEVOTHROID) 200 mcg tablet       Vilazodone Viibryd 20 mg Tablet       ARIPiprazole (ABILIFY) 5 mg tablet       cyanocobalamin 1,000 mcg/mL injection   INJECT ONE ML INTRAMUSCULARLY EVERY MONTH

## 2022-04-07 ENCOUNTER — PES CALL (OUTPATIENT)
Dept: ADMINISTRATIVE | Facility: CLINIC | Age: 52
End: 2022-04-07
Payer: MEDICARE

## 2024-06-12 PROBLEM — J44.9 CHRONIC OBSTRUCTIVE PULMONARY DISEASE: Status: ACTIVE | Noted: 2024-06-12

## 2024-06-12 PROBLEM — R06.09 DOE (DYSPNEA ON EXERTION): Status: ACTIVE | Noted: 2024-06-12

## 2025-05-06 ENCOUNTER — OFFICE VISIT (OUTPATIENT)
Dept: URGENT CARE | Facility: CLINIC | Age: 55
End: 2025-05-06
Payer: MEDICARE

## 2025-05-06 VITALS
BODY MASS INDEX: 27.68 KG/M2 | SYSTOLIC BLOOD PRESSURE: 140 MMHG | WEIGHT: 176.38 LBS | HEART RATE: 87 BPM | TEMPERATURE: 98 F | RESPIRATION RATE: 18 BRPM | HEIGHT: 67 IN | DIASTOLIC BLOOD PRESSURE: 85 MMHG | OXYGEN SATURATION: 97 %

## 2025-05-06 DIAGNOSIS — R05.9 COUGH, UNSPECIFIED TYPE: ICD-10-CM

## 2025-05-06 DIAGNOSIS — J02.9 SORE THROAT: ICD-10-CM

## 2025-05-06 DIAGNOSIS — L04.0 ACUTE CERVICAL LYMPHADENITIS: Primary | ICD-10-CM

## 2025-05-06 DIAGNOSIS — R09.81 NASAL SINUS CONGESTION: ICD-10-CM

## 2025-05-06 LAB
CTP QC/QA: YES
MOLECULAR STREP A: NEGATIVE

## 2025-05-06 PROCEDURE — 99204 OFFICE O/P NEW MOD 45 MIN: CPT | Mod: S$GLB,,, | Performed by: EMERGENCY MEDICINE

## 2025-05-06 PROCEDURE — 87651 STREP A DNA AMP PROBE: CPT | Mod: QW,S$GLB,, | Performed by: EMERGENCY MEDICINE

## 2025-05-06 RX ORDER — LOSARTAN POTASSIUM 25 MG/1
25 TABLET ORAL DAILY
COMMUNITY

## 2025-05-06 RX ORDER — PROMETHAZINE HYDROCHLORIDE AND DEXTROMETHORPHAN HYDROBROMIDE 6.25; 15 MG/5ML; MG/5ML
5 SYRUP ORAL NIGHTLY PRN
Qty: 50 ML | Refills: 0 | Status: SHIPPED | OUTPATIENT
Start: 2025-05-06 | End: 2025-05-16

## 2025-05-06 RX ORDER — AMOXICILLIN AND CLAVULANATE POTASSIUM 875; 125 MG/1; MG/1
1 TABLET, FILM COATED ORAL EVERY 12 HOURS
Qty: 14 TABLET | Refills: 0 | Status: SHIPPED | OUTPATIENT
Start: 2025-05-06 | End: 2025-05-13

## 2025-05-06 RX ORDER — AZELASTINE 1 MG/ML
1 SPRAY, METERED NASAL 2 TIMES DAILY PRN
Qty: 30 ML | Refills: 0 | Status: SHIPPED | OUTPATIENT
Start: 2025-05-06 | End: 2025-06-05

## 2025-05-07 NOTE — PROGRESS NOTES
"Subjective:      Patient ID: Noreen Wheatley is a 54 y.o. female.    Vitals:  height is 5' 7" (1.702 m) and weight is 80 kg (176 lb 5.9 oz). Her oral temperature is 98 °F (36.7 °C). Her blood pressure is 140/85 (abnormal) and her pulse is 87. Her respiration is 18 and oxygen saturation is 97%.     Chief Complaint: Sinus Problem    Pt presents with hoarse voice, feels a knot on the rt side of throat area, moving up to her rt ear causing pain, congestion, productive cough, sx started back on April 12th when diagnosed with pneumonia, states the antibiotics have not helped her. Review of ED visit by me shows she was diagnosed with mild left sided pneumonia per CXR on 4/12 and bacterial sinusitis - placed on Doxycycline.     She followed up with her PCP last week and he put her on a Medrol dose isabel which she is still taking.    She says the sore throat and right ear pain with swelling in neck area her biggest concerns - these really just began today. She has hoarse voice. No recent fever. She admits she is very anxious.     She does have h/o HTN - took her Losartan last night per usual. No SOB.     She continues to smoke but does wish to quit.      Sinus Problem  This is a new problem. The current episode started 1 to 4 weeks ago. The problem has been gradually worsening since onset. There has been no fever. Her pain is at a severity of 9/10. Associated symptoms include congestion, coughing, ear pain, headaches, a hoarse voice, neck pain, sinus pressure and a sore throat. Pertinent negatives include no chills, diaphoresis or shortness of breath. Past treatments include antibiotics and oral decongestants. The treatment provided no relief.       Constitution: Negative for chills, sweating, fatigue, fever and unexpected weight change.   HENT:  Positive for ear pain, congestion, sinus pressure, sore throat and voice change. Negative for drooling and trouble swallowing.    Neck: Positive for neck pain and neck swelling. " Negative for neck stiffness and painful lymph nodes.   Cardiovascular:  Negative for chest pain, leg swelling, palpitations, sob on exertion and passing out.   Eyes:  Negative for eye pain, eye redness, photophobia, double vision and blurred vision.   Respiratory:  Positive for cough. Negative for chest tightness, sputum production, bloody sputum, shortness of breath, stridor and wheezing.    Gastrointestinal:  Negative for abdominal pain, abdominal bloating, nausea, vomiting, constipation, diarrhea and heartburn.   Musculoskeletal:  Negative for joint pain, joint swelling, back pain, muscle cramps and muscle ache.   Skin:  Negative for rash and hives.   Allergic/Immunologic: Negative for hives and itching.   Neurological:  Positive for headaches. Negative for dizziness, light-headedness, passing out, loss of balance, altered mental status, loss of consciousness and seizures.   Hematologic/Lymphatic: Negative for swollen lymph nodes.   Psychiatric/Behavioral:  Negative for altered mental status and nervous/anxious. The patient is not nervous/anxious.       Objective:     Physical Exam   Constitutional: She is oriented to person, place, and time. She appears well-developed. She is cooperative.  Non-toxic appearance. She does not appear ill. No distress.   HENT:   Head: Normocephalic and atraumatic.   Ears:   Right Ear: Hearing, external ear and ear canal normal. Tympanic membrane is bulging. Tympanic membrane is not erythematous. A middle ear effusion is present.   Left Ear: Hearing, tympanic membrane, external ear and ear canal normal.  No middle ear effusion.   Nose: Mucosal edema, rhinorrhea and congestion present. No nasal deformity. No epistaxis. Right sinus exhibits no maxillary sinus tenderness and no frontal sinus tenderness. Left sinus exhibits no maxillary sinus tenderness and no frontal sinus tenderness.   Mouth/Throat: Uvula is midline, oropharynx is clear and moist and mucous membranes are normal. Mucous  membranes are moist. No trismus in the jaw. Normal dentition. No dental abscesses, uvula swelling or dental caries. No oropharyngeal exudate, posterior oropharyngeal edema or posterior oropharyngeal erythema.      Comments: No trismus, mild hoarseness, uvula midline, handling secretions well, mild diffuse erythema to posterior pharynx; tonsils appear absent; no peritonsillar fullness    No sublingual swelling, no submandibular swelling  Eyes: Conjunctivae and lids are normal. No scleral icterus.   Neck: Trachea normal and phonation normal. Neck supple. No edema present. No erythema present. No neck rigidity present.   Cardiovascular: Normal rate, regular rhythm, normal heart sounds and normal pulses.   No murmur heard.  Pulmonary/Chest: Effort normal and breath sounds normal. No respiratory distress. She has no decreased breath sounds. She has no wheezes. She has no rhonchi. She has no rales.   Abdominal: Normal appearance.   Musculoskeletal: Normal range of motion.         General: No deformity. Normal range of motion.   Lymphadenopathy:     She has cervical adenopathy (tender enlarged anterior cervical node on right, there is some enlarged cervical adenopathy anteriorly but nontender).   Neurological: She is alert and oriented to person, place, and time. She exhibits normal muscle tone. Coordination normal.   Skin: Skin is warm, dry, intact, not diaphoretic, not pale and no rash.   Psychiatric: Her speech is normal and behavior is normal. Judgment and thought content normal.   Nursing note and vitals reviewed.    Results for orders placed or performed in visit on 05/06/25   POCT Strep A, Molecular    Collection Time: 05/06/25  8:27 PM   Result Value Ref Range    Molecular Strep A, POC Negative Negative     Acceptable Yes         Assessment:     1. Acute cervical lymphadenitis    2. Sore throat    3. Nasal sinus congestion    4. Cough, unspecified type        Plan:     Has acutely tender enlarged  cervical node. No airway compromise at this time. Already on steroids per PCP. Will add Augmentin.     Recommend ED eval for worsening swelling, difficulty breathing or handling secretions.     ENT f/u for persistent sore throat.     Smoking cessation advised.     Acute cervical lymphadenitis  -     amoxicillin-clavulanate 875-125mg (AUGMENTIN) 875-125 mg per tablet; Take 1 tablet by mouth every 12 (twelve) hours. for 7 days  Dispense: 14 tablet; Refill: 0    Sore throat  -     POCT Strep A, Molecular    Nasal sinus congestion  -     azelastine (ASTELIN) 137 mcg (0.1 %) nasal spray; 1 spray (137 mcg total) by Nasal route 2 (two) times daily as needed for Rhinitis (nasal congestion).  Dispense: 30 mL; Refill: 0    Cough, unspecified type  -     promethazine-dextromethorphan (PROMETHAZINE-DM) 6.25-15 mg/5 mL Syrp; Take 5 mLs by mouth nightly as needed (cough- use caution for drowsiness).  Dispense: 50 mL; Refill: 0      Patient Instructions   Take antibiotic with food. While on antibiotics, take a daily probiotic such as Align or Culturelle or eat yogurt with live cultures (Activia is one example). This will lessen the chances of stomach upset.     As discussed, if this worsens, you are unable to swallow your own saliva, you develop high fever or worsening swelling in your neck, go to hospital ER.     If sore throat persists or voice does not return to normal, we recommend followup with ENT specialist.     Stop smoking.     You must understand that you've received an Urgent Care treatment only and that you may be released before all your medical problems are known or treated. You, the patient, will arrange for follow up care as instructed.    Follow up with your PCP or specialty clinic as directed if not improved or as needed. You can call [phone] to schedule an appointment with the appropriate provider.      You, the patient, will arrange for follow up care as instructed.     If your condition worsens or fails  to improve we recommend that you receive another evaluation at the ER immediately or contact your PCP to discuss your concerns.     Patient aware of treatment plan and verbalized understanding.

## 2025-05-07 NOTE — PATIENT INSTRUCTIONS
Take antibiotic with food. While on antibiotics, take a daily probiotic such as Align or Culturelle or eat yogurt with live cultures (Activia is one example). This will lessen the chances of stomach upset.     As discussed, if this worsens, you are unable to swallow your own saliva, you develop high fever or worsening swelling in your neck, go to hospital ER.     If sore throat persists or voice does not return to normal, we recommend followup with ENT specialist.     Stop smoking.     You must understand that you've received an Urgent Care treatment only and that you may be released before all your medical problems are known or treated. You, the patient, will arrange for follow up care as instructed.    Follow up with your PCP or specialty clinic as directed if not improved or as needed. You can call [phone] to schedule an appointment with the appropriate provider.      You, the patient, will arrange for follow up care as instructed.     If your condition worsens or fails to improve we recommend that you receive another evaluation at the ER immediately or contact your PCP to discuss your concerns.     Patient aware of treatment plan and verbalized understanding.